# Patient Record
Sex: FEMALE | Race: WHITE | NOT HISPANIC OR LATINO | Employment: UNEMPLOYED | URBAN - METROPOLITAN AREA
[De-identification: names, ages, dates, MRNs, and addresses within clinical notes are randomized per-mention and may not be internally consistent; named-entity substitution may affect disease eponyms.]

---

## 2023-08-21 PROBLEM — R29.898 WEAKNESS OF LOWER EXTREMITY: Status: ACTIVE | Noted: 2023-08-21

## 2023-08-21 PROBLEM — I10 BENIGN ESSENTIAL HYPERTENSION: Status: ACTIVE | Noted: 2023-08-21

## 2023-08-21 PROBLEM — M19.039 OSTEOARTHRITIS OF WRIST: Status: ACTIVE | Noted: 2023-08-21

## 2023-08-21 PROBLEM — S82.839A FRACTURE, FIBULA, PROXIMAL: Status: ACTIVE | Noted: 2023-08-21

## 2023-08-21 PROBLEM — R26.89 ANTALGIC GAIT: Status: ACTIVE | Noted: 2023-08-21

## 2023-08-21 PROBLEM — M46.1 OSTEOARTHRITIS OF SACROILIAC JOINT (CMS-HCC): Status: ACTIVE | Noted: 2023-08-21

## 2023-08-21 PROBLEM — M19.049 CMC ARTHRITIS: Status: ACTIVE | Noted: 2023-08-21

## 2023-08-21 PROBLEM — M87.051 AVASCULAR NECROSIS OF BONE OF RIGHT HIP (MULTI): Status: ACTIVE | Noted: 2023-08-21

## 2023-08-21 PROBLEM — M25.659 STIFFNESS OF HIP JOINT: Status: ACTIVE | Noted: 2023-08-21

## 2023-08-21 PROBLEM — M79.605 LEFT LEG PAIN: Status: ACTIVE | Noted: 2023-08-21

## 2023-08-21 PROBLEM — G56.00 CARPAL TUNNEL SYNDROME: Status: ACTIVE | Noted: 2023-08-21

## 2023-08-21 PROBLEM — M22.2X2 PATELLOFEMORAL PAIN SYNDROME OF LEFT KNEE: Status: ACTIVE | Noted: 2023-08-21

## 2023-08-21 PROBLEM — M16.11 ARTHRITIS OF RIGHT HIP: Status: ACTIVE | Noted: 2023-08-21

## 2023-08-21 PROBLEM — E16.2 MULTIPLE EPISODES OF HYPOGLYCEMIA: Status: ACTIVE | Noted: 2023-08-21

## 2023-08-21 PROBLEM — F33.1 MODERATE RECURRENT MAJOR DEPRESSION (MULTI): Status: ACTIVE | Noted: 2023-08-21

## 2023-08-21 PROBLEM — S89.92XA LEFT KNEE INJURY: Status: ACTIVE | Noted: 2023-08-21

## 2023-08-21 PROBLEM — M17.11 ARTHRITIS OF RIGHT KNEE: Status: ACTIVE | Noted: 2023-08-21

## 2023-08-21 PROBLEM — M79.642 PAIN OF LEFT HAND: Status: ACTIVE | Noted: 2023-08-21

## 2023-08-21 PROBLEM — F51.02 INSOMNIA DUE TO PSYCHOLOGICAL STRESS: Status: ACTIVE | Noted: 2023-08-21

## 2023-08-21 PROBLEM — F41.9 ANXIETY DISORDER, UNSPECIFIED: Status: ACTIVE | Noted: 2023-08-21

## 2023-08-21 PROBLEM — R29.898 WEAKNESS OF RIGHT HIP: Status: ACTIVE | Noted: 2023-08-21

## 2023-08-21 PROBLEM — M47.816 DJD (DEGENERATIVE JOINT DISEASE), LUMBAR: Status: ACTIVE | Noted: 2023-08-21

## 2023-08-21 PROBLEM — M47.812 CERVICAL SPONDYLOSIS WITHOUT MYELOPATHY: Status: ACTIVE | Noted: 2023-08-21

## 2023-08-21 PROBLEM — M25.572 LEFT ANKLE PAIN: Status: ACTIVE | Noted: 2023-08-21

## 2023-08-21 PROBLEM — M25.539 WRIST PAIN: Status: ACTIVE | Noted: 2023-08-21

## 2023-08-21 PROBLEM — E03.9 HYPOTHYROIDISM: Status: ACTIVE | Noted: 2023-08-21

## 2023-08-21 RX ORDER — CELECOXIB 100 MG/1
100 CAPSULE ORAL DAILY
COMMUNITY
Start: 2020-05-12 | End: 2023-10-05 | Stop reason: ALTCHOICE

## 2023-08-21 RX ORDER — HYDROCODONE BITARTRATE AND ACETAMINOPHEN 5; 325 MG/1; MG/1
1 TABLET ORAL EVERY 6 HOURS PRN
COMMUNITY
Start: 2022-10-26 | End: 2023-10-05 | Stop reason: ALTCHOICE

## 2023-08-21 RX ORDER — HYDROCHLOROTHIAZIDE 12.5 MG/1
12.5 TABLET ORAL DAILY
COMMUNITY
Start: 2021-01-20 | End: 2023-10-05 | Stop reason: ALTCHOICE

## 2023-08-21 RX ORDER — TRAZODONE HYDROCHLORIDE 50 MG/1
50 TABLET ORAL NIGHTLY
COMMUNITY
Start: 2020-12-11 | End: 2023-10-05 | Stop reason: ALTCHOICE

## 2023-08-21 RX ORDER — LEVOTHYROXINE SODIUM 25 UG/1
25 CAPSULE ORAL DAILY
COMMUNITY

## 2023-08-21 RX ORDER — LOSARTAN POTASSIUM 50 MG/1
50 TABLET ORAL DAILY
COMMUNITY
End: 2024-03-15

## 2023-08-21 RX ORDER — FLUTICASONE PROPIONATE 50 MCG
1 SPRAY, SUSPENSION (ML) NASAL DAILY
COMMUNITY
Start: 2017-06-09 | End: 2023-10-05 | Stop reason: ALTCHOICE

## 2023-08-21 RX ORDER — CYCLOBENZAPRINE HCL 10 MG
10 TABLET ORAL 3 TIMES DAILY PRN
COMMUNITY
End: 2023-11-09

## 2023-08-21 RX ORDER — IBUPROFEN 800 MG/1
800 TABLET ORAL
COMMUNITY

## 2023-08-21 RX ORDER — NAPROXEN 500 MG/1
500 TABLET ORAL
COMMUNITY
Start: 2018-02-09 | End: 2023-10-05 | Stop reason: ALTCHOICE

## 2023-08-21 RX ORDER — GABAPENTIN 100 MG/1
100 CAPSULE ORAL DAILY
COMMUNITY
Start: 2023-07-17 | End: 2024-06-12

## 2023-08-21 RX ORDER — CITALOPRAM 10 MG/1
10 TABLET ORAL DAILY
COMMUNITY
Start: 2017-03-27 | End: 2023-10-05 | Stop reason: ALTCHOICE

## 2023-08-21 RX ORDER — POTASSIUM CHLORIDE 20 MEQ/1
20 TABLET, EXTENDED RELEASE ORAL DAILY
COMMUNITY
End: 2023-10-21

## 2023-10-05 ENCOUNTER — OFFICE VISIT (OUTPATIENT)
Dept: PRIMARY CARE | Facility: CLINIC | Age: 58
End: 2023-10-05
Payer: COMMERCIAL

## 2023-10-05 VITALS
SYSTOLIC BLOOD PRESSURE: 120 MMHG | OXYGEN SATURATION: 98 % | DIASTOLIC BLOOD PRESSURE: 84 MMHG | HEART RATE: 87 BPM | BODY MASS INDEX: 25.49 KG/M2 | WEIGHT: 153.2 LBS | TEMPERATURE: 98.1 F

## 2023-10-05 DIAGNOSIS — F51.02 INSOMNIA DUE TO PSYCHOLOGICAL STRESS: ICD-10-CM

## 2023-10-05 DIAGNOSIS — S83.282D OTHER TEAR OF LATERAL MENISCUS OF LEFT KNEE, UNSPECIFIED WHETHER OLD OR CURRENT TEAR, SUBSEQUENT ENCOUNTER: ICD-10-CM

## 2023-10-05 DIAGNOSIS — F51.02 INSOMNIA DUE TO PSYCHOLOGICAL STRESS: Primary | ICD-10-CM

## 2023-10-05 DIAGNOSIS — Z00.00 ROUTINE GENERAL MEDICAL EXAMINATION AT A HEALTH CARE FACILITY: Primary | ICD-10-CM

## 2023-10-05 DIAGNOSIS — Z12.31 VISIT FOR SCREENING MAMMOGRAM: ICD-10-CM

## 2023-10-05 DIAGNOSIS — E03.9 HYPOTHYROIDISM, UNSPECIFIED TYPE: ICD-10-CM

## 2023-10-05 DIAGNOSIS — I10 BENIGN ESSENTIAL HYPERTENSION: ICD-10-CM

## 2023-10-05 PROBLEM — M46.1 OSTEOARTHRITIS OF SACROILIAC JOINT (CMS-HCC): Status: RESOLVED | Noted: 2023-08-21 | Resolved: 2023-10-05

## 2023-10-05 PROBLEM — M25.539 WRIST PAIN: Status: RESOLVED | Noted: 2023-08-21 | Resolved: 2023-10-05

## 2023-10-05 PROBLEM — R29.898 WEAKNESS OF RIGHT HIP: Status: RESOLVED | Noted: 2023-08-21 | Resolved: 2023-10-05

## 2023-10-05 PROBLEM — S82.839A FRACTURE, FIBULA, PROXIMAL: Status: RESOLVED | Noted: 2023-08-21 | Resolved: 2023-10-05

## 2023-10-05 PROBLEM — R29.898 WEAKNESS OF LOWER EXTREMITY: Status: RESOLVED | Noted: 2023-08-21 | Resolved: 2023-10-05

## 2023-10-05 PROBLEM — R26.89 ANTALGIC GAIT: Status: RESOLVED | Noted: 2023-08-21 | Resolved: 2023-10-05

## 2023-10-05 PROBLEM — S89.92XA LEFT KNEE INJURY: Status: RESOLVED | Noted: 2023-08-21 | Resolved: 2023-10-05

## 2023-10-05 PROBLEM — E16.2 MULTIPLE EPISODES OF HYPOGLYCEMIA: Status: RESOLVED | Noted: 2023-08-21 | Resolved: 2023-10-05

## 2023-10-05 PROBLEM — M22.2X2 PATELLOFEMORAL PAIN SYNDROME OF LEFT KNEE: Status: RESOLVED | Noted: 2023-08-21 | Resolved: 2023-10-05

## 2023-10-05 PROBLEM — M79.642 PAIN OF LEFT HAND: Status: RESOLVED | Noted: 2023-08-21 | Resolved: 2023-10-05

## 2023-10-05 PROBLEM — M79.605 LEFT LEG PAIN: Status: RESOLVED | Noted: 2023-08-21 | Resolved: 2023-10-05

## 2023-10-05 PROBLEM — M25.659 STIFFNESS OF HIP JOINT: Status: RESOLVED | Noted: 2023-08-21 | Resolved: 2023-10-05

## 2023-10-05 PROBLEM — M87.051 AVASCULAR NECROSIS OF BONE OF RIGHT HIP (MULTI): Status: RESOLVED | Noted: 2023-08-21 | Resolved: 2023-10-05

## 2023-10-05 PROBLEM — M25.572 LEFT ANKLE PAIN: Status: RESOLVED | Noted: 2023-08-21 | Resolved: 2023-10-05

## 2023-10-05 PROCEDURE — 3079F DIAST BP 80-89 MM HG: CPT | Performed by: FAMILY MEDICINE

## 2023-10-05 PROCEDURE — 99396 PREV VISIT EST AGE 40-64: CPT | Performed by: FAMILY MEDICINE

## 2023-10-05 PROCEDURE — 3074F SYST BP LT 130 MM HG: CPT | Performed by: FAMILY MEDICINE

## 2023-10-05 PROCEDURE — 1036F TOBACCO NON-USER: CPT | Performed by: FAMILY MEDICINE

## 2023-10-05 RX ORDER — LORAZEPAM 0.5 MG/1
TABLET ORAL
COMMUNITY
Start: 2023-09-06 | End: 2023-10-28

## 2023-10-05 RX ORDER — ZOLPIDEM TARTRATE 5 MG/1
TABLET ORAL
COMMUNITY
Start: 2023-08-28 | End: 2023-10-06

## 2023-10-05 RX ORDER — ONDANSETRON 4 MG/1
4 TABLET, ORALLY DISINTEGRATING ORAL
COMMUNITY
Start: 2023-02-17 | End: 2024-04-11 | Stop reason: ALTCHOICE

## 2023-10-05 ASSESSMENT — PATIENT HEALTH QUESTIONNAIRE - PHQ9
SUM OF ALL RESPONSES TO PHQ9 QUESTIONS 1 & 2: 0
1. LITTLE INTEREST OR PLEASURE IN DOING THINGS: NOT AT ALL
2. FEELING DOWN, DEPRESSED OR HOPELESS: NOT AT ALL

## 2023-10-05 ASSESSMENT — ENCOUNTER SYMPTOMS
OCCASIONAL FEELINGS OF UNSTEADINESS: 0
DEPRESSION: 0
LOSS OF SENSATION IN FEET: 0

## 2023-10-05 ASSESSMENT — LIFESTYLE VARIABLES
AUDIT-C TOTAL SCORE: 0
SKIP TO QUESTIONS 9-10: 1
HOW MANY STANDARD DRINKS CONTAINING ALCOHOL DO YOU HAVE ON A TYPICAL DAY: PATIENT DOES NOT DRINK
HOW OFTEN DO YOU HAVE A DRINK CONTAINING ALCOHOL: NEVER
HOW OFTEN DO YOU HAVE SIX OR MORE DRINKS ON ONE OCCASION: NEVER

## 2023-10-05 ASSESSMENT — PAIN SCALES - GENERAL: PAINLEVEL: 0-NO PAIN

## 2023-10-05 NOTE — PROGRESS NOTES
Subjective   Patient ID: Aure Cabral is a 58 y.o. female who presents for Annual Exam.    Colonoscopy: due - wants to check insurance  Mammogram: 2021  Pap Smear:  Bone Density: NA  Immunizations:    Here for physical.  Overall doing well.  Pt has been trying to get off of ambien    Pt is having left knee pain.  Patient is getting some instability.  Knee will crack and then buckle.  Patient has history of ACL repair in same knee in past.  Pain with going sitting to standing.  Wearing copper knee brace.  Pt has lateral meniscus tear and chondral loss of left.      Hypertension  -Patient is here for follow-up of elevated blood pressure.   -Blood pressure is well controlled at home.   -Cardiac symptoms: none.   -Patient denies chest pain, dyspnea, and irregular heart beat.   -Cardiologist:           Review of Systems    Objective   /84 (BP Location: Right arm, Patient Position: Sitting)   Pulse 87   Temp 36.7 °C (98.1 °F) (Temporal)   Wt 69.5 kg (153 lb 3.2 oz)   SpO2 98%   BMI 25.49 kg/m²     Physical Exam  Vitals reviewed.   Constitutional:       General: She is not in acute distress.  Cardiovascular:      Rate and Rhythm: Normal rate and regular rhythm.   Pulmonary:      Effort: Pulmonary effort is normal.      Breath sounds: No wheezing or rhonchi.   Musculoskeletal:      Right lower leg: No edema.      Left lower leg: No edema.   Lymphadenopathy:      Cervical: No cervical adenopathy.   Neurological:      Mental Status: She is alert.         Assessment/Plan   Diagnoses and all orders for this visit:  Routine general medical examination at a health care facility  Insomnia due to psychological stress  Benign essential hypertension  -     Lipid Panel; Future  -     TSH with reflex to Free T4 if abnormal; Future  Hypothyroidism, unspecified type  -     Lipid Panel; Future  -     TSH with reflex to Free T4 if abnormal; Future  Visit for screening mammogram  -     BI mammo bilateral screening  tomosynthesis; Future  Other tear of lateral meniscus of left knee, unspecified whether old or current tear, subsequent encounter  Other orders  -     Follow Up In Primary Care - Established; Future  -     Follow Up In Primary Care - Nurse Visit; Future

## 2023-10-05 NOTE — PATIENT INSTRUCTIONS
-Here for physical.      -For blood pressure - cut losartan 25 mg once a day.  Recommend home electronic blood pressure monitor - goal is < 140/90.  We will set up 3-4 week nurse visit.     -For sleep - continue to wean off of ambien - take 1/2 tab every other day for 1-2 weeks, then stop.  Use melatonin, sleepy time tea, sleep meditation.     -For thyroid - restarted levothyroxine.  Recheck TSH    -For cholesterol - recommend whole foods, limit added sugar foods.  We will restart thyroid and recheck and see if improved.     -For left knee - likely left lateral meniscus tear and chondral injury - recommend referral to orthopedics - call when ready.     Follow up in 6 months.

## 2023-10-06 RX ORDER — ZOLPIDEM TARTRATE 5 MG/1
2.5 TABLET ORAL NIGHTLY PRN
Qty: 30 TABLET | Refills: 0 | Status: SHIPPED | OUTPATIENT
Start: 2023-10-06 | End: 2023-11-27

## 2023-10-21 DIAGNOSIS — E87.6 HYPOKALEMIA: Primary | ICD-10-CM

## 2023-10-21 RX ORDER — POTASSIUM CHLORIDE 1500 MG/1
TABLET, EXTENDED RELEASE ORAL DAILY
Qty: 90 TABLET | Refills: 0 | Status: SHIPPED | OUTPATIENT
Start: 2023-10-21 | End: 2024-01-23

## 2023-10-28 DIAGNOSIS — F41.0 PANIC DISORDER WITHOUT AGORAPHOBIA: Primary | ICD-10-CM

## 2023-10-28 RX ORDER — LORAZEPAM 0.5 MG/1
TABLET ORAL
Qty: 10 TABLET | Refills: 0 | Status: SHIPPED | OUTPATIENT
Start: 2023-10-28 | End: 2023-11-19

## 2023-11-06 ENCOUNTER — APPOINTMENT (OUTPATIENT)
Dept: PRIMARY CARE | Facility: CLINIC | Age: 58
End: 2023-11-06
Payer: COMMERCIAL

## 2023-11-08 DIAGNOSIS — M47.812 CERVICAL SPONDYLOSIS WITHOUT MYELOPATHY: Primary | ICD-10-CM

## 2023-11-09 RX ORDER — CYCLOBENZAPRINE HCL 10 MG
10 TABLET ORAL 3 TIMES DAILY PRN
Qty: 270 TABLET | Refills: 0 | Status: SHIPPED | OUTPATIENT
Start: 2023-11-09 | End: 2024-03-15

## 2023-11-17 DIAGNOSIS — F41.0 PANIC DISORDER WITHOUT AGORAPHOBIA: ICD-10-CM

## 2023-11-19 RX ORDER — LORAZEPAM 0.5 MG/1
TABLET ORAL
Qty: 10 TABLET | Refills: 1 | Status: SHIPPED | OUTPATIENT
Start: 2023-11-19 | End: 2024-01-22

## 2023-11-26 DIAGNOSIS — F51.02 INSOMNIA DUE TO PSYCHOLOGICAL STRESS: ICD-10-CM

## 2023-11-27 RX ORDER — ZOLPIDEM TARTRATE 5 MG/1
TABLET ORAL
Qty: 30 TABLET | Refills: 2 | Status: SHIPPED | OUTPATIENT
Start: 2023-11-27 | End: 2024-05-13

## 2024-01-22 DIAGNOSIS — F41.0 PANIC DISORDER WITHOUT AGORAPHOBIA: ICD-10-CM

## 2024-01-22 RX ORDER — LORAZEPAM 0.5 MG/1
TABLET ORAL
Qty: 10 TABLET | Refills: 2 | Status: SHIPPED | OUTPATIENT
Start: 2024-01-22 | End: 2024-05-16

## 2024-01-23 DIAGNOSIS — E87.6 HYPOKALEMIA: ICD-10-CM

## 2024-01-23 RX ORDER — POTASSIUM CHLORIDE 20 MEQ/1
20 TABLET, EXTENDED RELEASE ORAL DAILY
Qty: 90 TABLET | Refills: 0 | Status: SHIPPED | OUTPATIENT
Start: 2024-01-23 | End: 2024-04-22

## 2024-02-16 ENCOUNTER — APPOINTMENT (OUTPATIENT)
Dept: PRIMARY CARE | Facility: CLINIC | Age: 59
End: 2024-02-16
Payer: COMMERCIAL

## 2024-02-19 ENCOUNTER — TELEPHONE (OUTPATIENT)
Dept: ORTHOPEDIC SURGERY | Facility: CLINIC | Age: 59
End: 2024-02-19
Payer: COMMERCIAL

## 2024-02-19 DIAGNOSIS — M79.642 HAND PAIN, LEFT: ICD-10-CM

## 2024-02-19 NOTE — TELEPHONE ENCOUNTER
3/4/24 lt hand pain  Patient wants an xray of her hand, but has to have it done at a [facility] because xray at [facility] is out of network. Are you able to tell me once order is in so I can call her? Thank you

## 2024-03-04 ENCOUNTER — OFFICE VISIT (OUTPATIENT)
Dept: ORTHOPEDIC SURGERY | Facility: CLINIC | Age: 59
End: 2024-03-04
Payer: COMMERCIAL

## 2024-03-04 DIAGNOSIS — M67.432 GANGLION OF LEFT WRIST: Primary | ICD-10-CM

## 2024-03-04 PROCEDURE — 99213 OFFICE O/P EST LOW 20 MIN: CPT | Performed by: ORTHOPAEDIC SURGERY

## 2024-03-04 PROCEDURE — 1036F TOBACCO NON-USER: CPT | Performed by: ORTHOPAEDIC SURGERY

## 2024-03-04 ASSESSMENT — PAIN - FUNCTIONAL ASSESSMENT: PAIN_FUNCTIONAL_ASSESSMENT: NO/DENIES PAIN

## 2024-03-05 NOTE — PROGRESS NOTES
History of Present Illness:  Chief Complaint   Patient presents with    Left Hand - Pain     58-year-old female status post left thumb CMC arthroplasty in October 2022.  She noticed a mass about her left dorsal wrist a few weeks ago and has had increasing pain around that area.  The pain was initially located about the dorsal wrist, but now seems to be radiating towards her thumb as well.  No regular numbness or tingling, but she did have slight paresthesias into her small and ring finger this morning that resolved with arm repositioning.  This has not occurred previously.    Past Medical History:   Diagnosis Date    Avascular necrosis of bone of right hip (CMS/[facility]) 08/21/2023       Medication Documentation Review Audit       Reviewed by Lui Connor MD (Physician) on 03/05/24 at 0711      Medication Order Taking? Sig Documenting Provider Last Dose Status   cyclobenzaprine (Flexeril) 10 mg tablet [id number]  Take 1 tablet by mouth three times daily as needed Abelino Otto, DO  Active   gabapentin (Neurontin) 100 mg capsule [id number] No Take 1 capsule (100 mg) by mouth once daily. As needed Historical Provider, MD Taking Active   ibuprofen 800 mg tablet [id number] No Take 1 tablet (800 mg) by mouth 3 times a day with meals. Historical Provider, MD Taking Active   levothyroxine (Tirosint) 25 mcg capsule [id number] No 1 capsule (25 mcg) once daily. Historical Provider, MD Taking Active   LORazepam (Ativan) 0.5 mg tablet [id number]  TAKE 1/2 TO 1 (ONE-HALF TO ONE) TABLET BY MOUTH ONCE DAILY AS NEEDED FOR 10 DAYS Abelino Otto, DO  Active   losartan (Cozaar) 50 mg tablet [id number] No Take 1 tablet (50 mg) by mouth once daily. Historical Provider, MD Taking Active   multivit-min/iron/folic acid/K (ADULTS MULTIVITAMIN ORAL) [id number] No Take 1 tablet by mouth once daily. As directed Historical Provider, MD Taking Active   ondansetron ODT (Zofran-ODT) 4 mg disintegrating tablet [id number] No Take 1 tablet (4 mg) by  mouth. Historical Provider, MD Taking Active   potassium chloride CR 20 mEq ER tablet [id number]  Take 1 tablet by mouth once daily Abelino Otto, DO  Active   zolpidem (Ambien) 5 mg tablet [id number]  TAKE 1/2 (ONE-HALF) TABLET BY MOUTH AS NEEDED AT BEDTIME FOR SLEEP Abelino Otto, DO  Active                    Allergies   Allergen Reactions    Celecoxib Itching    Diazepam Headache    Fluoxetine Other    Hydroxyzine Pamoate Unknown    Lisinopril Cough    Melatonin Unknown    Mirtazapine Other    Nabumetone Itching    Opioids - Morphine Analogues Unknown    Oxycodone-Acetaminophen Itching    Paroxetine Hcl Other    Tramadol Dizziness    Trazodone Hcl Other    Valerian Unknown    Zolpidem Tartrate Headache    Levothyroxine Palpitations       Social History     Socioeconomic History    Marital status:      Spouse name: Not on file    Number of children: Not on file    Years of education: Not on file    Highest education level: Not on file   Occupational History    Not on file   Tobacco Use    Smoking status: Never    Smokeless tobacco: Never   Substance and Sexual Activity    Alcohol use: Never    Drug use: Never    Sexual activity: Not on file   Other Topics Concern    Not on file   Social History Narrative    Not on file     Social Determinants of Health     Financial Resource Strain: Not on file   Food Insecurity: Not on file   Transportation Needs: Not on file   Physical Activity: Not on file   Stress: Not on file   Social Connections: Not on file   Intimate Partner Violence: Not on file   Housing Stability: Not on file       Past Surgical History:   Procedure Laterality Date    ANTERIOR CRUCIATE LIGAMENT REPAIR  02/24/2015    Primary Repair Of Knee Ligament Cruciate Anterior    MR ARTHROGRAM SHOULDER RIGHT W FL GUIDED INJECTION Right 10/12/2012    MR SHOULDER ARTHROGRAM RIGHT W FL GUIDED INJECTION [facility]        Review of Systems   GENERAL: Negative for malaise, significant weight loss,  fever  MUSCULOSKELETAL: see HPI  NEURO:  Negative     Physical Examination  Constitutional: Appears well-developed and well-nourished.  Head: Normocephalic and atraumatic.  Eyes: EOMI grossly  Cardiovascular: Intact distal pulses.   Respiratory: Effort normal. No respiratory distress.  Neurologic: Alert and oriented to person, place, and time.  Skin: Skin is warm and dry.  Hematologic / Lymphatic: No lymphedema, lymphangitis.  Psychiatric: normal mood and affect. Behavior is normal.   Musculoskeletal:  Left hand/wrist: Palpable mass about the dorsal wrist with transillumination.  This measures approximately 1 x 1 cm.  Slightly firm and mobile.  Tenderness about this area as well as about the radio-scaphoid joint.  No tenderness about thumb CMC.  Sensation intact throughout distally.  Negative Tinel's at level of carpal tunnel.  Negative Durkan's/Phalen's.  Negative elbow flexion test.    Radiographs: Left hand radiographs ordered and available for my review from February 29, 2024: Status post trapeziectomy with slight metacarpal subsidence.  Narrowing of radio-scaphoid joint     Assessment:  Patient with left wrist ganglion likely secondary to underlying radioscaphoid arthritic changes.  Primary pain generator seems to be coming from arthritis.       Plan:  We discussed diagnoses as well as risks and benefits of various treatment options including anti-inflammatories (oral versus topical) as well as potential role of bracing and corticosteroid injections.  She may consider injection after discussions with her insurance and will call for scheduling if she wishes to proceed.

## 2024-03-14 DIAGNOSIS — M47.812 CERVICAL SPONDYLOSIS WITHOUT MYELOPATHY: ICD-10-CM

## 2024-03-15 ENCOUNTER — APPOINTMENT (OUTPATIENT)
Dept: PRIMARY CARE | Facility: CLINIC | Age: 59
End: 2024-03-15
Payer: COMMERCIAL

## 2024-03-15 DIAGNOSIS — I10 BENIGN ESSENTIAL HYPERTENSION: ICD-10-CM

## 2024-03-15 RX ORDER — CYCLOBENZAPRINE HCL 10 MG
10 TABLET ORAL 3 TIMES DAILY PRN
Qty: 270 TABLET | Refills: 0 | Status: SHIPPED | OUTPATIENT
Start: 2024-03-15

## 2024-03-15 RX ORDER — LOSARTAN POTASSIUM 50 MG/1
50 TABLET ORAL DAILY
Qty: 90 TABLET | Refills: 1 | Status: SHIPPED | OUTPATIENT
Start: 2024-03-15 | End: 2024-04-11 | Stop reason: ALTCHOICE

## 2024-04-08 PROBLEM — E55.9 VITAMIN D DEFICIENCY: Status: ACTIVE | Noted: 2024-04-08

## 2024-04-11 ENCOUNTER — OFFICE VISIT (OUTPATIENT)
Dept: PRIMARY CARE | Facility: CLINIC | Age: 59
End: 2024-04-11
Payer: COMMERCIAL

## 2024-04-11 VITALS
OXYGEN SATURATION: 97 % | HEART RATE: 108 BPM | BODY MASS INDEX: 25.33 KG/M2 | SYSTOLIC BLOOD PRESSURE: 104 MMHG | WEIGHT: 152.2 LBS | DIASTOLIC BLOOD PRESSURE: 70 MMHG

## 2024-04-11 DIAGNOSIS — T78.40XA ALLERGY, INITIAL ENCOUNTER: ICD-10-CM

## 2024-04-11 DIAGNOSIS — Z12.31 VISIT FOR SCREENING MAMMOGRAM: ICD-10-CM

## 2024-04-11 DIAGNOSIS — F51.02 INSOMNIA DUE TO PSYCHOLOGICAL STRESS: ICD-10-CM

## 2024-04-11 DIAGNOSIS — I10 BENIGN ESSENTIAL HYPERTENSION: Primary | ICD-10-CM

## 2024-04-11 PROCEDURE — 1036F TOBACCO NON-USER: CPT | Performed by: FAMILY MEDICINE

## 2024-04-11 PROCEDURE — 99214 OFFICE O/P EST MOD 30 MIN: CPT | Performed by: FAMILY MEDICINE

## 2024-04-11 PROCEDURE — 3078F DIAST BP <80 MM HG: CPT | Performed by: FAMILY MEDICINE

## 2024-04-11 PROCEDURE — 3074F SYST BP LT 130 MM HG: CPT | Performed by: FAMILY MEDICINE

## 2024-04-11 RX ORDER — FLUTICASONE PROPIONATE 50 MCG
1 SPRAY, SUSPENSION (ML) NASAL DAILY
Qty: 16 G | Refills: 5 | Status: SHIPPED | OUTPATIENT
Start: 2024-04-11 | End: 2025-04-11

## 2024-04-11 ASSESSMENT — LIFESTYLE VARIABLES
HOW OFTEN DO YOU HAVE SIX OR MORE DRINKS ON ONE OCCASION: NEVER
SKIP TO QUESTIONS 9-10: 1
HOW MANY STANDARD DRINKS CONTAINING ALCOHOL DO YOU HAVE ON A TYPICAL DAY: PATIENT DOES NOT DRINK
AUDIT-C TOTAL SCORE: 0
HOW OFTEN DO YOU HAVE A DRINK CONTAINING ALCOHOL: NEVER

## 2024-04-11 ASSESSMENT — ENCOUNTER SYMPTOMS
DEPRESSION: 0
LOSS OF SENSATION IN FEET: 0
OCCASIONAL FEELINGS OF UNSTEADINESS: 0

## 2024-04-11 ASSESSMENT — PATIENT HEALTH QUESTIONNAIRE - PHQ9
1. LITTLE INTEREST OR PLEASURE IN DOING THINGS: NOT AT ALL
2. FEELING DOWN, DEPRESSED OR HOPELESS: NOT AT ALL
SUM OF ALL RESPONSES TO PHQ9 QUESTIONS 1 & 2: 0

## 2024-04-11 ASSESSMENT — PAIN SCALES - GENERAL: PAINLEVEL: 0-NO PAIN

## 2024-04-11 NOTE — PROGRESS NOTES
Subjective   Patient ID: Aure Cabral is a 58 y.o. female who presents for 6 MO F/U.    Here for routine follow up.  Pt had a recent fall down stairs.  Hit arm and stairs.  Injured her knee and thigh.      Pt is having left knee pain.  Patient is getting some instability.  Knee will crack and then buckle.  Patient has history of ACL repair in same knee in past.  Pain with going sitting to standing.  Wearing copper knee brace.  Pt has lateral meniscus tear and chondral loss of left.  Using TENS unit.      Allergies  -Pt is renting a house.  Pt has been congested for 1 year.  Not sure if allergic to something house.  Pt is having sinus headaches.  Patient has tried sudafed and gauifenesin.  Pt will be moving in may.  Patient does not have running nose feels pressure.      Hypertension  -Patient is here for follow-up of elevated blood pressure.   -Blood pressure is well controlled at home.  Off of losartan.    -Cardiac symptoms: none.   -Patient denies chest pain, dyspnea, and irregular heart beat.   -Cardiologist:    Sleep:  -Using ambien - weaning off of.  Taking 1/2 of 5mg.  Using magnesium.  Not sleeping well.             Review of Systems    Objective   /70 (BP Location: Right arm, Patient Position: Sitting)   Pulse 108   Wt 69 kg (152 lb 3.2 oz)   SpO2 97%   BMI 25.33 kg/m²     Physical Exam  Vitals reviewed.   Constitutional:       General: She is not in acute distress.  Cardiovascular:      Rate and Rhythm: Normal rate and regular rhythm.   Pulmonary:      Effort: Pulmonary effort is normal.      Breath sounds: No wheezing or rhonchi.   Musculoskeletal:      Right lower leg: No edema.      Left lower leg: No edema.   Lymphadenopathy:      Cervical: No cervical adenopathy.   Neurological:      Mental Status: She is alert.     Colon Cancer Screening Refusal.    -Patient refuses colon cancer screening including FIT testing, Cologuard and colonoscopy. Is aware of the implications of refusal including  missed early detection of colorectal cancer which could lead to increased morbidity and mortality.     Assessment/Plan   Diagnoses and all orders for this visit:  Benign essential hypertension  -     TSH with reflex to Free T4 if abnormal; Future  -     Lipid Panel; Future  Allergy, initial encounter  -     fluticasone (Flonase) 50 mcg/actuation nasal spray; Administer 1 spray into each nostril once daily. Shake gently. Before first use, prime pump. After use, clean tip and replace cap.  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  Please check blood pressure 2-3 times a week. Goal is <140/90. If you are above this frequently, please call for medication adjustment. Recommend upper arm electronic cuff.    For allergies - recommend flonase 2 sprays each nostril.  If not helping, add claritin.      Sleep - weaning down ambien.      For thryoid - abnormal last check - we will recheck    For cholesterol - elevated - we will recheck.      Follow up in 6 months for physical

## 2024-04-11 NOTE — PATIENT INSTRUCTIONS
For blood pressure - well controlled off of medication.  Please check blood pressure 2-3 times a week. Goal is <140/90. If you are above this frequently, please call for medication adjustment. Recommend upper arm electronic cuff.    For allergies - recommend flonase 2 sprays each nostril.  If not helping, add claritin.      Sleep - weaning down ambien.      For thyroid - abnormal last check - we will recheck    For cholesterol - elevated - we will recheck.

## 2024-04-22 DIAGNOSIS — E87.6 HYPOKALEMIA: ICD-10-CM

## 2024-04-22 RX ORDER — POTASSIUM CHLORIDE 1500 MG/1
20 TABLET, EXTENDED RELEASE ORAL DAILY
Qty: 90 TABLET | Refills: 0 | Status: SHIPPED | OUTPATIENT
Start: 2024-04-22

## 2024-05-11 DIAGNOSIS — F51.02 INSOMNIA DUE TO PSYCHOLOGICAL STRESS: ICD-10-CM

## 2024-05-13 RX ORDER — ZOLPIDEM TARTRATE 5 MG/1
TABLET ORAL
Qty: 15 TABLET | Refills: 2 | Status: SHIPPED | OUTPATIENT
Start: 2024-05-13

## 2024-05-15 DIAGNOSIS — F41.0 PANIC DISORDER WITHOUT AGORAPHOBIA: ICD-10-CM

## 2024-05-16 RX ORDER — LORAZEPAM 0.5 MG/1
TABLET ORAL
Qty: 10 TABLET | Refills: 2 | Status: SHIPPED | OUTPATIENT
Start: 2024-05-16

## 2024-07-02 ENCOUNTER — HOSPITAL ENCOUNTER (EMERGENCY)
Facility: HOSPITAL | Age: 59
Discharge: HOME | End: 2024-07-02
Attending: STUDENT IN AN ORGANIZED HEALTH CARE EDUCATION/TRAINING PROGRAM
Payer: COMMERCIAL

## 2024-07-02 VITALS
WEIGHT: 145 LBS | TEMPERATURE: 97.7 F | DIASTOLIC BLOOD PRESSURE: 93 MMHG | SYSTOLIC BLOOD PRESSURE: 138 MMHG | BODY MASS INDEX: 24.16 KG/M2 | HEIGHT: 65 IN | HEART RATE: 94 BPM | RESPIRATION RATE: 16 BRPM | OXYGEN SATURATION: 98 %

## 2024-07-02 DIAGNOSIS — A60.04 HERPES SIMPLEX VULVOVAGINITIS: ICD-10-CM

## 2024-07-02 DIAGNOSIS — R10.2 VAGINAL PAIN: Primary | ICD-10-CM

## 2024-07-02 LAB
APPEARANCE UR: ABNORMAL
BILIRUB UR STRIP.AUTO-MCNC: NEGATIVE MG/DL
CLUE CELLS SPEC QL WET PREP: NORMAL
CLUE CELLS SPEC QL WET PREP: NORMAL
COLOR UR: ABNORMAL
GLUCOSE UR STRIP.AUTO-MCNC: NORMAL MG/DL
KETONES UR STRIP.AUTO-MCNC: ABNORMAL MG/DL
LEUKOCYTE ESTERASE UR QL STRIP.AUTO: ABNORMAL
MUCOUS THREADS #/AREA URNS AUTO: ABNORMAL /LPF
NITRITE UR QL STRIP.AUTO: NEGATIVE
PH UR STRIP.AUTO: 5.5 [PH]
PROT UR STRIP.AUTO-MCNC: NEGATIVE MG/DL
RBC # UR STRIP.AUTO: NEGATIVE /UL
RBC #/AREA URNS AUTO: ABNORMAL /HPF
SP GR UR STRIP.AUTO: 1.01
SQUAMOUS #/AREA URNS AUTO: ABNORMAL /HPF
T VAGINALIS SPEC QL WET PREP: NORMAL
T VAGINALIS SPEC QL WET PREP: NORMAL
TRICHOMONAS REFLEX COMMENT: NORMAL
UROBILINOGEN UR STRIP.AUTO-MCNC: NORMAL MG/DL
WBC #/AREA URNS AUTO: ABNORMAL /HPF
WBC CLUMPS #/AREA URNS AUTO: ABNORMAL /HPF
WBC VAG QL WET PREP: NORMAL
WBC VAG QL WET PREP: NORMAL
YEAST VAG QL WET PREP: NORMAL
YEAST VAG QL WET PREP: NORMAL

## 2024-07-02 PROCEDURE — 2500000004 HC RX 250 GENERAL PHARMACY W/ HCPCS (ALT 636 FOR OP/ED): Performed by: STUDENT IN AN ORGANIZED HEALTH CARE EDUCATION/TRAINING PROGRAM

## 2024-07-02 PROCEDURE — 81001 URINALYSIS AUTO W/SCOPE: CPT | Performed by: STUDENT IN AN ORGANIZED HEALTH CARE EDUCATION/TRAINING PROGRAM

## 2024-07-02 PROCEDURE — 96372 THER/PROPH/DIAG INJ SC/IM: CPT | Performed by: STUDENT IN AN ORGANIZED HEALTH CARE EDUCATION/TRAINING PROGRAM

## 2024-07-02 PROCEDURE — 87210 SMEAR WET MOUNT SALINE/INK: CPT | Mod: 59 | Performed by: STUDENT IN AN ORGANIZED HEALTH CARE EDUCATION/TRAINING PROGRAM

## 2024-07-02 PROCEDURE — 87491 CHLMYD TRACH DNA AMP PROBE: CPT | Mod: GEALAB | Performed by: STUDENT IN AN ORGANIZED HEALTH CARE EDUCATION/TRAINING PROGRAM

## 2024-07-02 PROCEDURE — 87086 URINE CULTURE/COLONY COUNT: CPT | Mod: GEALAB | Performed by: STUDENT IN AN ORGANIZED HEALTH CARE EDUCATION/TRAINING PROGRAM

## 2024-07-02 PROCEDURE — 2500000002 HC RX 250 W HCPCS SELF ADMINISTERED DRUGS (ALT 637 FOR MEDICARE OP, ALT 636 FOR OP/ED): Performed by: STUDENT IN AN ORGANIZED HEALTH CARE EDUCATION/TRAINING PROGRAM

## 2024-07-02 PROCEDURE — 2500000005 HC RX 250 GENERAL PHARMACY W/O HCPCS: Performed by: STUDENT IN AN ORGANIZED HEALTH CARE EDUCATION/TRAINING PROGRAM

## 2024-07-02 PROCEDURE — 99283 EMERGENCY DEPT VISIT LOW MDM: CPT

## 2024-07-02 PROCEDURE — 87661 TRICHOMONAS VAGINALIS AMPLIF: CPT | Mod: GEALAB | Performed by: STUDENT IN AN ORGANIZED HEALTH CARE EDUCATION/TRAINING PROGRAM

## 2024-07-02 RX ORDER — LIDOCAINE HYDROCHLORIDE 20 MG/ML
1 JELLY TOPICAL ONCE
Qty: 10 ML | Refills: 0 | Status: SHIPPED | OUTPATIENT
Start: 2024-07-02 | End: 2024-07-02

## 2024-07-02 RX ORDER — VALACYCLOVIR HYDROCHLORIDE 1 G/1
1000 TABLET, FILM COATED ORAL 2 TIMES DAILY
Qty: 20 TABLET | Refills: 0 | Status: SHIPPED | OUTPATIENT
Start: 2024-07-02 | End: 2024-07-12

## 2024-07-02 RX ORDER — KETOROLAC TROMETHAMINE 30 MG/ML
30 INJECTION, SOLUTION INTRAMUSCULAR; INTRAVENOUS ONCE
Status: COMPLETED | OUTPATIENT
Start: 2024-07-02 | End: 2024-07-02

## 2024-07-02 RX ORDER — HYDROCODONE BITARTRATE AND ACETAMINOPHEN 5; 325 MG/1; MG/1
1 TABLET ORAL EVERY 6 HOURS PRN
Qty: 8 TABLET | Refills: 0 | Status: SHIPPED | OUTPATIENT
Start: 2024-07-02 | End: 2024-07-04

## 2024-07-02 RX ORDER — LIDOCAINE HYDROCHLORIDE 20 MG/ML
1 JELLY TOPICAL ONCE
Status: COMPLETED | OUTPATIENT
Start: 2024-07-02 | End: 2024-07-02

## 2024-07-02 RX ORDER — VALACYCLOVIR HYDROCHLORIDE 500 MG/1
1000 TABLET, FILM COATED ORAL ONCE
Status: COMPLETED | OUTPATIENT
Start: 2024-07-02 | End: 2024-07-02

## 2024-07-02 ASSESSMENT — LIFESTYLE VARIABLES
TOTAL SCORE: 0
EVER FELT BAD OR GUILTY ABOUT YOUR DRINKING: NO
HAVE PEOPLE ANNOYED YOU BY CRITICIZING YOUR DRINKING: NO
HAVE YOU EVER FELT YOU SHOULD CUT DOWN ON YOUR DRINKING: NO
EVER HAD A DRINK FIRST THING IN THE MORNING TO STEADY YOUR NERVES TO GET RID OF A HANGOVER: NO

## 2024-07-02 ASSESSMENT — PAIN SCALES - GENERAL
PAINLEVEL_OUTOF10: 0 - NO PAIN
PAINLEVEL_OUTOF10: 6
PAINLEVEL_OUTOF10: 4

## 2024-07-02 ASSESSMENT — COLUMBIA-SUICIDE SEVERITY RATING SCALE - C-SSRS
2. HAVE YOU ACTUALLY HAD ANY THOUGHTS OF KILLING YOURSELF?: NO
6. HAVE YOU EVER DONE ANYTHING, STARTED TO DO ANYTHING, OR PREPARED TO DO ANYTHING TO END YOUR LIFE?: NO
1. IN THE PAST MONTH, HAVE YOU WISHED YOU WERE DEAD OR WISHED YOU COULD GO TO SLEEP AND NOT WAKE UP?: NO

## 2024-07-02 ASSESSMENT — PAIN - FUNCTIONAL ASSESSMENT: PAIN_FUNCTIONAL_ASSESSMENT: 0-10

## 2024-07-02 ASSESSMENT — PAIN DESCRIPTION - LOCATION: LOCATION: VAGINA

## 2024-07-02 NOTE — ED TRIAGE NOTES
"Patient here for Saturday vagina became \"raw\" went to [facility] and was diagnosed with vaginal atrophy. Was started on Nystatin, estrogen cream and vagisil States it doesn't itch but it hurts  "

## 2024-07-02 NOTE — ED PROVIDER NOTES
"CC: Vaginitis/Bacterial Vaginosis (Saturday vagina became \"raw\" went to [facility] and was diagnosed with vaginal atrophy. Was started on Nystatin, estrogen cream and vagisil States it doesn't itch but it hurts)     HPI:  Patient is a 58-year-old female who presents to the emergency department with severe pain around her vagina.  Started on Saturday.  Thought she may have heard a pop and has not been able to pee due to the severe burning when it touches her vaginal ring.  States she has not been sexually active in 3 years.  She went to [facility] and was diagnosed with vaginal atrophy and discharged with nystatin vaginal cream and badges still that is not providing any relief of symptoms.  Patient is in tears.  She states she is drinking less water so that she does not urinate as much as it burns when the urine touches the area.  Patient denies fevers or chills.  She does report a lot of increased stress lately due to moving.    Records Reviewed:  Recent available ED and inpatient notes reviewed in EMR.    PMHx/PSHx:  Per HPI.   - has a past medical history of Avascular necrosis of bone of right hip (Multi).  - has a past surgical history that includes Anterior cruciate ligament repair (02/24/2015) and MR arthrogram shoulder right w FL guided injection (Right, 10/12/2012).  - has Anxiety disorder, unspecified; Arthritis of right hip; Arthritis of right knee; Benign essential hypertension; Cervical spondylosis without myelopathy; CMC arthritis; DJD (degenerative joint disease), lumbar; Hypothyroidism; Insomnia due to psychological stress; Moderate recurrent major depression (Multi); Osteoarthritis of wrist; Carpal tunnel syndrome; and Vitamin D deficiency on their problem list.    Medications:  Reviewed in EMR. See EMR for complete list of medications and doses.    Allergies:  Celecoxib, Diazepam, Fluoxetine, Hydroxyzine pamoate, Lisinopril, Melatonin, Mirtazapine, Nabumetone, Opioids - morphine analogues, " Oxycodone-acetaminophen, Paroxetine hcl, Tramadol, Trazodone hcl, Valerian, Zolpidem tartrate, and Levothyroxine    Social History:  - Tobacco:  reports that she has never smoked. She has never used smokeless tobacco.   - Alcohol:  reports no history of alcohol use.   - Illicit Drugs:  reports no history of drug use.     ROS:  Per HPI.       ???????????????????????????????????????????????????????????????  Triage Vitals:  T 36.5 °C (97.7 °F)  HR 96  BP (!) 142/101  RR 18  O2 98 % None (Room air)    Physical Exam  ???????????????????????????????????????????????????????????????  GEN: Uncomfortable appearing  CVS/CHEST: reg rate, nl rhythm  PULM: Nonlabored breathing.  Saturating appropriately on room air.  GI: soft, NT/ND, no rebound or guarding   : Ulcers with surrounding erythema around introitus.  NEURO: Awake and alert, Strength and sensation is equal in b/l upper and lower extremities, normal ambulation  SKIN: warm, dry  PSYCH: AAOx3 answers questions appropriately    Assessment and Plan:  Patient is a 58-year-old who presents to the emergency department with vaginal pain.  On exam she does have ulcerations that appear consistent with genital herpes.  Patient encouraged to discontinue the medication she was prescribed at [facility].  Patient became very tearful and states her first  was diagnosed with genital herpes but it has been 30 years since she has had intercourse with him.  Discussed how the virus may lay dormant until times of stress.  Patient does report significant increased stress.  Patient started on Valtrex.  Will send her urine for culture prior to also starting an antibiotic as the only burning she has is when it is touching the lesions.  Put a Uro-Jet for symptomatic relief with improvement.  Sent for a wet prep as well as chlamydia and trichomonas to rule out other etiologies other coexisting sexually transmitted infections.  Discussed with patient the diagnosis was very tearful but  expressed understanding.  Will make an appointment for outpatient gynecology follow-up.    ED Course:  Diagnoses as of 07/02/24 1717   Vaginal pain   Herpes simplex vulvovaginitis       Social Determinants Limiting Care:  Significant stress lately    Disposition:  Discharged in stable condition with return precautions    Karli Hahn, DO      Procedures ? [employer] last updated 7/2/2024 5:17 PM        Karli Hahn,   07/02/24 1720

## 2024-07-03 LAB
C TRACH RRNA SPEC QL NAA+PROBE: NEGATIVE
HOLD SPECIMEN: NORMAL
N GONORRHOEA DNA SPEC QL PROBE+SIG AMP: NEGATIVE
T VAGINALIS RRNA SPEC QL NAA+PROBE: NEGATIVE

## 2024-07-03 PROCEDURE — RXMED WILLOW AMBULATORY MEDICATION CHARGE

## 2024-07-04 LAB — BACTERIA UR CULT: NO GROWTH

## 2024-07-05 ENCOUNTER — PHARMACY VISIT (OUTPATIENT)
Dept: PHARMACY | Facility: CLINIC | Age: 59
End: 2024-07-05
Payer: COMMERCIAL

## 2024-07-05 PROCEDURE — RXOTC WILLOW AMBULATORY OTC CHARGE

## 2024-07-12 DIAGNOSIS — M17.11 ARTHRITIS OF RIGHT KNEE: Primary | ICD-10-CM

## 2024-07-12 RX ORDER — IBUPROFEN 800 MG/1
800 TABLET ORAL EVERY 8 HOURS PRN
Qty: 90 TABLET | Refills: 2 | Status: SHIPPED | OUTPATIENT
Start: 2024-07-12 | End: 2025-07-12

## 2024-07-24 DIAGNOSIS — E87.6 HYPOKALEMIA: ICD-10-CM

## 2024-07-24 RX ORDER — POTASSIUM CHLORIDE 1500 MG/1
20 TABLET, EXTENDED RELEASE ORAL DAILY
Qty: 90 TABLET | Refills: 0 | Status: SHIPPED | OUTPATIENT
Start: 2024-07-24

## 2024-07-29 ENCOUNTER — TELEPHONE (OUTPATIENT)
Dept: PRIMARY CARE | Facility: CLINIC | Age: 59
End: 2024-07-29
Payer: COMMERCIAL

## 2024-07-29 DIAGNOSIS — F51.02 INSOMNIA DUE TO PSYCHOLOGICAL STRESS: ICD-10-CM

## 2024-07-29 RX ORDER — ZOLPIDEM TARTRATE 5 MG/1
TABLET ORAL
Qty: 15 TABLET | Refills: 2 | Status: SHIPPED | OUTPATIENT
Start: 2024-07-29

## 2024-07-29 NOTE — TELEPHONE ENCOUNTER
[facility] has the Zolpidem medication in stock. She is asking if it can be sent to them. For the medication she was not able to split as it was to small.

## 2024-07-31 ENCOUNTER — OFFICE VISIT (OUTPATIENT)
Dept: OBSTETRICS AND GYNECOLOGY | Facility: CLINIC | Age: 59
End: 2024-07-31
Payer: COMMERCIAL

## 2024-07-31 VITALS
DIASTOLIC BLOOD PRESSURE: 100 MMHG | WEIGHT: 143.4 LBS | SYSTOLIC BLOOD PRESSURE: 122 MMHG | HEIGHT: 65 IN | BODY MASS INDEX: 23.89 KG/M2

## 2024-07-31 DIAGNOSIS — R10.2 VAGINAL PAIN: ICD-10-CM

## 2024-07-31 DIAGNOSIS — B00.9 HSV (HERPES SIMPLEX VIRUS) INFECTION: ICD-10-CM

## 2024-07-31 DIAGNOSIS — Z12.31 SCREENING MAMMOGRAM FOR BREAST CANCER: ICD-10-CM

## 2024-07-31 DIAGNOSIS — Z01.419 WELL WOMAN EXAM WITH ROUTINE GYNECOLOGICAL EXAM: Primary | ICD-10-CM

## 2024-07-31 DIAGNOSIS — F41.0 PANIC DISORDER WITHOUT AGORAPHOBIA: ICD-10-CM

## 2024-07-31 PROCEDURE — 99203 OFFICE O/P NEW LOW 30 MIN: CPT | Performed by: OBSTETRICS & GYNECOLOGY

## 2024-07-31 PROCEDURE — 99386 PREV VISIT NEW AGE 40-64: CPT | Performed by: OBSTETRICS & GYNECOLOGY

## 2024-07-31 PROCEDURE — 99213 OFFICE O/P EST LOW 20 MIN: CPT | Performed by: OBSTETRICS & GYNECOLOGY

## 2024-07-31 PROCEDURE — 3074F SYST BP LT 130 MM HG: CPT | Performed by: OBSTETRICS & GYNECOLOGY

## 2024-07-31 PROCEDURE — 3008F BODY MASS INDEX DOCD: CPT | Performed by: OBSTETRICS & GYNECOLOGY

## 2024-07-31 PROCEDURE — 3080F DIAST BP >= 90 MM HG: CPT | Performed by: OBSTETRICS & GYNECOLOGY

## 2024-07-31 RX ORDER — VALACYCLOVIR HYDROCHLORIDE 500 MG/1
500 TABLET, FILM COATED ORAL 2 TIMES DAILY
Qty: 6 TABLET | Refills: 5 | Status: SHIPPED | OUTPATIENT
Start: 2024-07-31 | End: 2024-08-03

## 2024-07-31 ASSESSMENT — PATIENT HEALTH QUESTIONNAIRE - PHQ9
1. LITTLE INTEREST OR PLEASURE IN DOING THINGS: NOT AT ALL
SUM OF ALL RESPONSES TO PHQ9 QUESTIONS 1 AND 2: 0
2. FEELING DOWN, DEPRESSED OR HOPELESS: NOT AT ALL

## 2024-07-31 ASSESSMENT — ENCOUNTER SYMPTOMS
OCCASIONAL FEELINGS OF UNSTEADINESS: 0
DEPRESSION: 0
LOSS OF SENSATION IN FEET: 0

## 2024-07-31 ASSESSMENT — PAIN SCALES - GENERAL: PAINLEVEL: 0-NO PAIN

## 2024-08-01 ENCOUNTER — PHARMACY VISIT (OUTPATIENT)
Dept: PHARMACY | Facility: CLINIC | Age: 59
End: 2024-08-01
Payer: COMMERCIAL

## 2024-08-01 ENCOUNTER — TELEPHONE (OUTPATIENT)
Dept: PRIMARY CARE | Facility: CLINIC | Age: 59
End: 2024-08-01
Payer: COMMERCIAL

## 2024-08-01 PROCEDURE — RXOTC WILLOW AMBULATORY OTC CHARGE

## 2024-08-01 PROCEDURE — RXMED WILLOW AMBULATORY MEDICATION CHARGE

## 2024-08-01 RX ORDER — LORAZEPAM 0.5 MG/1
TABLET ORAL
Qty: 10 TABLET | Refills: 2 | Status: SHIPPED | OUTPATIENT
Start: 2024-08-01

## 2024-08-01 NOTE — TELEPHONE ENCOUNTER
She is calling for an appt to discuss her medications with you. She would like the zolpidem changed back to the 5 mg, she's not sleeping. I did offer 9/9 and she said that is too far out.

## 2024-08-02 DIAGNOSIS — M47.812 CERVICAL SPONDYLOSIS WITHOUT MYELOPATHY: ICD-10-CM

## 2024-08-03 RX ORDER — CYCLOBENZAPRINE HCL 10 MG
10 TABLET ORAL 3 TIMES DAILY PRN
Qty: 270 TABLET | Refills: 0 | Status: SHIPPED | OUTPATIENT
Start: 2024-08-03

## 2024-08-04 NOTE — PROGRESS NOTES
Subjective   Aure Cabral is a 58 y.o.  female who presents for annual exam. The patient has no complaints today. The patient is sexually active. GYN screening history: last pap: was normal. The patient is not taking hormone replacement therapy. Patient denies post-menopausal vaginal bleeding.. The patient wears seatbelts: yes. The patient participates in regular exercise: yes. Has the patient ever been transfused or tattooed?: no. The patient reports that there is not domestic violence in their life.  Hx of vagial pain, due to HSV outbreak which has resolved.  Needs refills.     Menstrual History:  OB History          1    Para   1    Term   1            AB        Living   1         SAB        IAB        Ectopic        Multiple        Live Births                    No LMP recorded. Patient is perimenopausal.         Past Medical History:   Diagnosis Date    Avascular necrosis of bone of right hip (Multi) 2023       Past Surgical History:   Procedure Laterality Date    ANTERIOR CRUCIATE LIGAMENT REPAIR  2015    Primary Repair Of Knee Ligament Cruciate Anterior    MR ARTHROGRAM SHOULDER RIGHT W FL GUIDED INJECTION Right 10/12/2012    MR SHOULDER ARTHROGRAM RIGHT W FL GUIDED INJECTION [facility]        Allergies   Allergen Reactions    Celecoxib Itching    Diazepam Headache    Fluoxetine Other    Hydroxyzine Pamoate Unknown    Lisinopril Cough    Melatonin Unknown    Mirtazapine Other    Nabumetone Itching    Opioids - Morphine Analogues Unknown    Oxycodone-Acetaminophen Itching    Paroxetine Hcl Other    Tramadol Dizziness    Trazodone Hcl Other    Valerian Unknown    Zolpidem Tartrate Headache    Levothyroxine Palpitations         Family History   Problem Relation Name Age of Onset    Lupus Sister      Diabetes Paternal Grandmother          amputation        Social History     Socioeconomic History    Marital status:      Spouse name: Not on file    Number of  "children: Not on file    Years of education: Not on file    Highest education level: Not on file   Occupational History    Not on file   Tobacco Use    Smoking status: Never    Smokeless tobacco: Never   Vaping Use    Vaping status: Never Used   Substance and Sexual Activity    Alcohol use: Never    Drug use: Never    Sexual activity: Not Currently   Other Topics Concern    Not on file   Social History Narrative    Not on file     Social Determinants of Health     Financial Resource Strain: Not on file   Food Insecurity: Not on file   Transportation Needs: Not on file   Physical Activity: Not on file   Stress: Not on file   Social Connections: Not on file   Intimate Partner Violence: Not on file   Housing Stability: Not on file            Objective   BP (!) 122/100   Ht 1.651 m (5' 5\")   Wt 65 kg (143 lb 6.4 oz)   BMI 23.86 kg/m²     Physical Exam  Vitals and nursing note reviewed.   Constitutional:       General: She is not in acute distress.     Appearance: Normal appearance. She is not ill-appearing.   HENT:      Head: Normocephalic and atraumatic.      Mouth/Throat:      Mouth: Mucous membranes are moist.      Pharynx: Oropharynx is clear.   Eyes:      Extraocular Movements: Extraocular movements intact.      Conjunctiva/sclera: Conjunctivae normal.      Pupils: Pupils are equal, round, and reactive to light.   Neck:      Thyroid: No thyroid mass, thyromegaly or thyroid tenderness.   Cardiovascular:      Rate and Rhythm: Normal rate and regular rhythm.      Pulses: Normal pulses.      Heart sounds: Normal heart sounds. No murmur heard.  Pulmonary:      Effort: Pulmonary effort is normal.      Breath sounds: No wheezing or rhonchi.   Chest:   Breasts:     Right: Normal. No swelling, bleeding, inverted nipple, mass, nipple discharge, skin change or tenderness.      Left: Normal. No swelling, bleeding, inverted nipple, mass, nipple discharge, skin change or tenderness.   Abdominal:      General: Bowel sounds are " normal. There is no distension.      Palpations: There is no mass.      Tenderness: There is no abdominal tenderness. There is no guarding or rebound.      Hernia: No hernia is present. There is no hernia in the left inguinal area or right inguinal area.   Genitourinary:     General: Normal vulva.      Pubic Area: No rash.       Labia:         Right: No rash, tenderness, lesion or injury.         Left: No rash, tenderness, lesion or injury.       Urethra: No prolapse or urethral swelling.      Vagina: No signs of injury. No vaginal discharge, tenderness or prolapsed vaginal walls.      Cervix: No cervical motion tenderness, discharge, friability, lesion, erythema or cervical bleeding.      Uterus: Not deviated, not enlarged, not fixed, not tender and no uterine prolapse.       Adnexa:         Right: No mass, tenderness or fullness.          Left: No mass, tenderness or fullness.     Musculoskeletal:         General: No swelling, tenderness, deformity or signs of injury. Normal range of motion.      Cervical back: No rigidity.   Lymphadenopathy:      Cervical: No cervical adenopathy.      Upper Body:      Right upper body: No axillary adenopathy.      Left upper body: No axillary adenopathy.      Lower Body: No right inguinal adenopathy. No left inguinal adenopathy.   Skin:     General: Skin is warm.      Findings: No lesion or rash.   Neurological:      General: No focal deficit present.      Mental Status: She is alert and oriented to person, place, and time.   Psychiatric:         Mood and Affect: Mood normal.         Behavior: Behavior normal.         Thought Content: Thought content normal.         Judgment: Judgment normal.            Assessment/Plan   Problem List Items Addressed This Visit    None  Visit Diagnoses       Well woman exam with routine gynecological exam    -  Primary    Relevant Orders    THINPREP PAP TEST (25-30)    Vaginal pain        HSV (herpes simplex virus) infection        Screening  mammogram for breast cancer        Relevant Orders    BI mammo bilateral screening tomosynthesis               All questions answered.  Await pap smear results.  Breast self exam technique reviewed and patient encouraged to perform self-exam monthly.  Diagnosis explained in detail, including differential.  Discussed healthy lifestyle modifications.  Mammogram.

## 2024-08-06 ENCOUNTER — TELEMEDICINE (OUTPATIENT)
Dept: PRIMARY CARE | Facility: CLINIC | Age: 59
End: 2024-08-06
Payer: COMMERCIAL

## 2024-08-06 DIAGNOSIS — F51.02 INSOMNIA DUE TO PSYCHOLOGICAL STRESS: ICD-10-CM

## 2024-08-06 PROCEDURE — 99213 OFFICE O/P EST LOW 20 MIN: CPT | Performed by: FAMILY MEDICINE

## 2024-08-06 PROCEDURE — 1036F TOBACCO NON-USER: CPT | Performed by: FAMILY MEDICINE

## 2024-08-06 RX ORDER — ZOLPIDEM TARTRATE 5 MG/1
5 TABLET ORAL NIGHTLY PRN
Qty: 30 TABLET | Refills: 2 | Status: SHIPPED | OUTPATIENT
Start: 2024-08-06 | End: 2024-09-05

## 2024-08-06 ASSESSMENT — PATIENT HEALTH QUESTIONNAIRE - PHQ9
2. FEELING DOWN, DEPRESSED OR HOPELESS: NOT AT ALL
SUM OF ALL RESPONSES TO PHQ9 QUESTIONS 1 & 2: 0
1. LITTLE INTEREST OR PLEASURE IN DOING THINGS: NOT AT ALL

## 2024-08-06 ASSESSMENT — PAIN SCALES - GENERAL: PAINLEVEL: 0-NO PAIN

## 2024-08-06 ASSESSMENT — ENCOUNTER SYMPTOMS
LOSS OF SENSATION IN FEET: 0
DEPRESSION: 0
OCCASIONAL FEELINGS OF UNSTEADINESS: 0

## 2024-08-06 ASSESSMENT — LIFESTYLE VARIABLES
AUDIT-C TOTAL SCORE: 0
HOW OFTEN DO YOU HAVE A DRINK CONTAINING ALCOHOL: NEVER
HOW OFTEN DO YOU HAVE SIX OR MORE DRINKS ON ONE OCCASION: NEVER
SKIP TO QUESTIONS 9-10: 1
HOW MANY STANDARD DRINKS CONTAINING ALCOHOL DO YOU HAVE ON A TYPICAL DAY: PATIENT DOES NOT DRINK

## 2024-08-06 NOTE — PROGRESS NOTES
Subjective   Patient ID: Aure Cabral is a 58 y.o. female who presents for Medication Questions ([phone]).    Spoke with patient via phone.        Sleep:  -Using ambien - weaning off of.  Taking 1/2 of 5mg.  Not sleeping well.  Had to move.  Recent medical issues.  Increased stress with work.  Pt would like to increase to 5mg of ambien.            Review of Systems    Objective   There were no vitals taken for this visit.    Physical Exam  Vitals reviewed: phone visit.     An OARRS report was pulled up from database and I have personally reviewed the results.  I have considered the risk of abuse, dependance, addiction, and diversion.  I believe it is clinically appropriate for this patient to continue taking this medication.      Assessment/Plan   Diagnoses and all orders for this visit:  Insomnia due to psychological stress  -     zolpidem (Ambien) 5 mg tablet; Take 1 tablet (5 mg) by mouth as needed at bedtime for sleep.    Ok to increase.  Follow up as scheduled.

## 2024-08-15 ENCOUNTER — TELEPHONE (OUTPATIENT)
Dept: OBSTETRICS AND GYNECOLOGY | Facility: CLINIC | Age: 59
End: 2024-08-15
Payer: COMMERCIAL

## 2024-08-15 NOTE — TELEPHONE ENCOUNTER
Awaiting results. If she does not have her mychart activated, then we will send written notification.

## 2024-08-15 NOTE — TELEPHONE ENCOUNTER
Pt called looking for PAP results, informed pt it's still in process. Pt asked to leave message for provider, informed pt I'd send provider a message for her. Pt was seen on 07/31/2024

## 2024-08-16 ENCOUNTER — TELEPHONE (OUTPATIENT)
Dept: PRIMARY CARE | Facility: CLINIC | Age: 59
End: 2024-08-16
Payer: COMMERCIAL

## 2024-08-16 DIAGNOSIS — F51.02 INSOMNIA DUE TO PSYCHOLOGICAL STRESS: ICD-10-CM

## 2024-08-16 LAB
CYTOLOGY CMNT CVX/VAG CYTO-IMP: NORMAL
LAB AP HPV GENOTYPE QUESTION: NO
LAB AP HPV HR: NORMAL
LABORATORY COMMENT REPORT: NORMAL
PATH REPORT.TOTAL CANCER: NORMAL

## 2024-08-16 RX ORDER — ZOLPIDEM TARTRATE 5 MG/1
5 TABLET ORAL NIGHTLY PRN
Qty: 30 TABLET | Refills: 2 | Status: SHIPPED | OUTPATIENT
Start: 2024-08-16 | End: 2024-09-15

## 2024-08-16 NOTE — TELEPHONE ENCOUNTER
Unsatisfactory specimen.  Sometimes we don't get enough cells for them to analyze.  She will need to have this repeated, but we will not be charging for this visit.

## 2024-08-16 NOTE — TELEPHONE ENCOUNTER
RX for Zolpidem was sent to [facility] and should have been sent [facility].  Can this be resent?  Please call & advise.  Thank you

## 2024-08-19 PROCEDURE — RXMED WILLOW AMBULATORY MEDICATION CHARGE

## 2024-08-20 ENCOUNTER — PHARMACY VISIT (OUTPATIENT)
Dept: PHARMACY | Facility: CLINIC | Age: 59
End: 2024-08-20
Payer: COMMERCIAL

## 2024-08-20 PROCEDURE — RXOTC WILLOW AMBULATORY OTC CHARGE

## 2024-09-20 PROCEDURE — RXMED WILLOW AMBULATORY MEDICATION CHARGE

## 2024-09-21 ENCOUNTER — PHARMACY VISIT (OUTPATIENT)
Dept: PHARMACY | Facility: CLINIC | Age: 59
End: 2024-09-21
Payer: COMMERCIAL

## 2024-09-24 ENCOUNTER — APPOINTMENT (OUTPATIENT)
Dept: OBSTETRICS AND GYNECOLOGY | Facility: CLINIC | Age: 59
End: 2024-09-24
Payer: COMMERCIAL

## 2024-10-17 PROBLEM — E03.8 OTHER SPECIFIED HYPOTHYROIDISM: Status: ACTIVE | Noted: 2023-08-21

## 2024-10-18 ENCOUNTER — OFFICE VISIT (OUTPATIENT)
Dept: PRIMARY CARE | Facility: CLINIC | Age: 59
End: 2024-10-18
Payer: COMMERCIAL

## 2024-10-18 VITALS — DIASTOLIC BLOOD PRESSURE: 86 MMHG | HEART RATE: 103 BPM | SYSTOLIC BLOOD PRESSURE: 132 MMHG | OXYGEN SATURATION: 97 %

## 2024-10-18 DIAGNOSIS — Z12.11 SCREEN FOR COLON CANCER: ICD-10-CM

## 2024-10-18 DIAGNOSIS — R09.81 SINUS CONGESTION: ICD-10-CM

## 2024-10-18 DIAGNOSIS — M47.812 CERVICAL SPONDYLOSIS WITHOUT MYELOPATHY: ICD-10-CM

## 2024-10-18 DIAGNOSIS — I10 BENIGN ESSENTIAL HYPERTENSION: ICD-10-CM

## 2024-10-18 DIAGNOSIS — K59.00 CONSTIPATION, UNSPECIFIED CONSTIPATION TYPE: ICD-10-CM

## 2024-10-18 DIAGNOSIS — E03.8 OTHER SPECIFIED HYPOTHYROIDISM: ICD-10-CM

## 2024-10-18 DIAGNOSIS — L70.0 ACNE VULGARIS: ICD-10-CM

## 2024-10-18 DIAGNOSIS — F41.0 PANIC DISORDER WITHOUT AGORAPHOBIA: ICD-10-CM

## 2024-10-18 DIAGNOSIS — H00.025 HORDEOLUM INTERNUM OF LEFT LOWER EYELID: ICD-10-CM

## 2024-10-18 DIAGNOSIS — Z00.00 ROUTINE GENERAL MEDICAL EXAMINATION AT A HEALTH CARE FACILITY: Primary | ICD-10-CM

## 2024-10-18 PROCEDURE — 1036F TOBACCO NON-USER: CPT | Performed by: FAMILY MEDICINE

## 2024-10-18 PROCEDURE — 3079F DIAST BP 80-89 MM HG: CPT | Performed by: FAMILY MEDICINE

## 2024-10-18 PROCEDURE — 99396 PREV VISIT EST AGE 40-64: CPT | Performed by: FAMILY MEDICINE

## 2024-10-18 PROCEDURE — 3075F SYST BP GE 130 - 139MM HG: CPT | Performed by: FAMILY MEDICINE

## 2024-10-18 RX ORDER — BENZOYL PEROXIDE 50 MG/ML
LIQUID TOPICAL DAILY
Qty: 236 G | Refills: 1 | Status: SHIPPED | OUTPATIENT
Start: 2024-10-18 | End: 2025-10-18

## 2024-10-18 RX ORDER — LORAZEPAM 0.5 MG/1
0.5 TABLET ORAL DAILY PRN
Qty: 10 TABLET | Refills: 2 | Status: SHIPPED | OUTPATIENT
Start: 2024-10-18

## 2024-10-18 RX ORDER — CYCLOBENZAPRINE HCL 10 MG
10 TABLET ORAL 3 TIMES DAILY PRN
Qty: 270 TABLET | Refills: 0 | Status: SHIPPED | OUTPATIENT
Start: 2024-10-18

## 2024-10-18 RX ORDER — AZELASTINE 1 MG/ML
1 SPRAY, METERED NASAL 2 TIMES DAILY
Qty: 30 ML | Refills: 12 | Status: SHIPPED | OUTPATIENT
Start: 2024-10-18 | End: 2025-10-18

## 2024-10-18 RX ORDER — ERYTHROMYCIN 5 MG/G
1 OINTMENT OPHTHALMIC EVERY 6 HOURS
Qty: 3.5 G | Refills: 0 | Status: SHIPPED | OUTPATIENT
Start: 2024-10-18 | End: 2024-10-25

## 2024-10-18 ASSESSMENT — ENCOUNTER SYMPTOMS
DEPRESSION: 0
OCCASIONAL FEELINGS OF UNSTEADINESS: 0
LOSS OF SENSATION IN FEET: 0

## 2024-10-18 ASSESSMENT — PATIENT HEALTH QUESTIONNAIRE - PHQ9
2. FEELING DOWN, DEPRESSED OR HOPELESS: NOT AT ALL
1. LITTLE INTEREST OR PLEASURE IN DOING THINGS: NOT AT ALL
SUM OF ALL RESPONSES TO PHQ9 QUESTIONS 1 & 2: 0

## 2024-10-18 ASSESSMENT — LIFESTYLE VARIABLES
HOW MANY STANDARD DRINKS CONTAINING ALCOHOL DO YOU HAVE ON A TYPICAL DAY: PATIENT DOES NOT DRINK
HOW OFTEN DO YOU HAVE SIX OR MORE DRINKS ON ONE OCCASION: NEVER
SKIP TO QUESTIONS 9-10: 1
AUDIT-C TOTAL SCORE: 0
HOW OFTEN DO YOU HAVE A DRINK CONTAINING ALCOHOL: NEVER

## 2024-10-18 NOTE — PATIENT INSTRUCTIONS
Here for physical.  Order for blood work.  Referral to gastroenterology for colon cancer screening.  Order for mammogram in system    For stye - use baby soap and warm water to wash eyelid.  Use erythromycin ointment.     For acne - use benzyol peroxide wash on chest/back daily    For nasal congestin - use astelin daily.  If not helping referral to ENT.     Follow up in 6 months.

## 2024-10-18 NOTE — PROGRESS NOTES
Subjective   Patient ID: Aure Cabral is a 59 y.o. female who presents for Annual Exam.    Patient is here for annual physical.      Left eye  -Pt has swelling, redness lower lid.   -Started yesterday.      Allergies  -Still having issues.  Patient changed apartments - still having issues.  Still congested.  No rhinorrhea, but gets burning and congestion.  Patient has tried sudafed and mucinex in past.  Pt has tried mucinex-D - no benefit.  Flonase - no benefit.      Hypertension  -Patient is here for follow-up of elevated blood pressure.   -Blood pressure is well controlled at home.  Off of losartan.    -Cardiac symptoms: none.   -Patient denies chest pain, dyspnea, and irregular heart beat.   -Cardiologist:    Sleep:  -Using ambien - weaning off of.  Taking 1/2 of 5mg.  Using magnesium.  Not sleeping well.      Thyroid  -Due for recheck.  Having more constipation.  Patient has been drinking more fluids.  Symptoms for a few months.  Recent hemorrhoid.  No hx of colon cancer testing.  Has not tried anything else      Acne  -Pt has back acne.  Not improving.  Using rubbing alcohol.  Using neosporin.  None on face.  Patient does have some on chest.   Dove soap.   Not using moisturizer.             Review of Systems    Objective   /86 (BP Location: Right arm, Patient Position: Sitting)   Pulse 103   SpO2 97%     Physical Exam  Vitals reviewed.   Constitutional:       General: She is not in acute distress.  HENT:      Right Ear: Tympanic membrane and ear canal normal.      Left Ear: Tympanic membrane and ear canal normal.      Nose: Congestion present. No rhinorrhea.   Eyes:      Comments: Stye left lower eyelid   Cardiovascular:      Rate and Rhythm: Normal rate and regular rhythm.   Pulmonary:      Effort: Pulmonary effort is normal.      Breath sounds: No wheezing or rhonchi.   Musculoskeletal:      Right lower leg: No edema.      Left lower leg: No edema.   Lymphadenopathy:      Cervical: No cervical  adenopathy.   Skin:     Comments: Excoriated lesions on upper back   Neurological:      Mental Status: She is alert.     An OARRS report was pulled up from database and I have personally reviewed the results.  I have considered the risk of abuse, dependance, addiction, and diversion.  I believe it is clinically appropriate for this patient to continue taking this medication.      Assessment/Plan   Diagnoses and all orders for this visit:  Routine general medical examination at a health care facility  Benign essential hypertension  Other specified hypothyroidism  Constipation, unspecified constipation type  Hordeolum internum of left lower eyelid  Sinus congestion  Acne vulgaris  Screen for colon cancer  Cervical spondylosis without myelopathy  Panic disorder without agoraphobia    Patient Instructions   Here for physical.  Order for blood work.  Referral to gastroenterology for colon cancer screening.  Order for mammogram in system    For stye - use baby soap and warm water to wash eyelid.  Use erythromycin ointment.     For acne - use benzyol peroxide wash on chest/back daily    For nasal congestin - use astelin daily.  If not helping referral to ENT.     Follow up in 6 months.

## 2024-10-19 PROCEDURE — RXMED WILLOW AMBULATORY MEDICATION CHARGE

## 2024-10-21 ENCOUNTER — PHARMACY VISIT (OUTPATIENT)
Dept: PHARMACY | Facility: CLINIC | Age: 59
End: 2024-10-21
Payer: COMMERCIAL

## 2024-10-25 DIAGNOSIS — E87.6 HYPOKALEMIA: ICD-10-CM

## 2024-10-25 RX ORDER — POTASSIUM CHLORIDE 1500 MG/1
20 TABLET, EXTENDED RELEASE ORAL DAILY
Qty: 90 TABLET | Refills: 1 | Status: SHIPPED | OUTPATIENT
Start: 2024-10-25

## 2024-11-07 ENCOUNTER — TELEPHONE (OUTPATIENT)
Dept: PRIMARY CARE | Facility: CLINIC | Age: 59
End: 2024-11-07
Payer: COMMERCIAL

## 2024-11-07 DIAGNOSIS — E03.9 HYPOTHYROIDISM, UNSPECIFIED TYPE: Primary | ICD-10-CM

## 2024-11-07 NOTE — TELEPHONE ENCOUNTER
Please inform pt bloodwork shows normal liver and kidney function, blood count WNL, chol is controlled, TSH slightly elevated but free levels WNL.  Recommend recheck of tsh in 6 week with TPO antibodies.  Order in system.

## 2024-11-07 NOTE — TELEPHONE ENCOUNTER
Problem: Cardiac Surgery  Goal: Hemodynamic stability achieved/maintained  Description: Hemodynamic instability may include VS or rhythm changes or s/s FVE.  AHA guidelines: Keep BP>90 mm Hg.  Outcome: Outcome Met, Continue evaluating goal progress toward completion  Flowsheets (Taken 3/22/2021 2043 by Neelam Paul RN)  BP: 142/66 !  Note: Pt requiring 15mg/hr of cardene gtt. Pt also receiving 100mg Q6h of hydralazine and PRN hydralazine as well. Unable to maintain MAP goal of 60-70. PT's MAP maintaining within 80s.   Goal: Extubation criteria met (Goal within 24 hours post-op)  Outcome: Outcome Not Met, Plan Adjusted  Note: Pt not ready to be extubated. Still on 65% on ventilator with a PEEP of +7. Pt is comfortable/sedated with dilaudid, precedex and ketamine. Orders to start weaning sedation as tolerated received today.   Goal: Elimination status is maintained/returned to baseline  Outcome: Outcome Not Met, Plan Adjusted  Flowsheets (Taken 3/22/2021 1123)  Stool Characteristics: Liquid  Note: Pt still requiring flexi//urinary catheter       Patient aware. Copy of labs and future orders mailed to patient.  PL

## 2024-11-18 ENCOUNTER — PHARMACY VISIT (OUTPATIENT)
Dept: PHARMACY | Facility: CLINIC | Age: 59
End: 2024-11-18
Payer: COMMERCIAL

## 2024-11-18 DIAGNOSIS — F51.02 INSOMNIA DUE TO PSYCHOLOGICAL STRESS: ICD-10-CM

## 2024-11-18 PROCEDURE — RXMED WILLOW AMBULATORY MEDICATION CHARGE

## 2024-11-18 PROCEDURE — RXOTC WILLOW AMBULATORY OTC CHARGE

## 2024-11-18 RX ORDER — ZOLPIDEM TARTRATE 5 MG/1
5 TABLET ORAL NIGHTLY PRN
Qty: 30 TABLET | Refills: 2 | Status: SHIPPED | OUTPATIENT
Start: 2024-11-18 | End: 2024-12-18

## 2024-11-26 ENCOUNTER — APPOINTMENT (OUTPATIENT)
Dept: OBSTETRICS AND GYNECOLOGY | Facility: CLINIC | Age: 59
End: 2024-11-26
Payer: COMMERCIAL

## 2024-11-29 DIAGNOSIS — S83.282D OTHER TEAR OF LATERAL MENISCUS OF LEFT KNEE, UNSPECIFIED WHETHER OLD OR CURRENT TEAR, SUBSEQUENT ENCOUNTER: Primary | ICD-10-CM

## 2024-11-30 RX ORDER — DICLOFENAC SODIUM 10 MG/G
GEL TOPICAL
Qty: 300 G | Refills: 2 | Status: SHIPPED | OUTPATIENT
Start: 2024-11-30

## 2024-12-09 ENCOUNTER — APPOINTMENT (OUTPATIENT)
Dept: OBSTETRICS AND GYNECOLOGY | Facility: CLINIC | Age: 59
End: 2024-12-09
Payer: COMMERCIAL

## 2024-12-16 ENCOUNTER — PHARMACY VISIT (OUTPATIENT)
Dept: PHARMACY | Facility: CLINIC | Age: 59
End: 2024-12-16
Payer: COMMERCIAL

## 2024-12-16 PROCEDURE — RXMED WILLOW AMBULATORY MEDICATION CHARGE

## 2024-12-30 ENCOUNTER — TELEPHONE (OUTPATIENT)
Dept: ORTHOPEDIC SURGERY | Facility: CLINIC | Age: 59
End: 2024-12-30

## 2024-12-30 ENCOUNTER — OFFICE VISIT (OUTPATIENT)
Dept: OBSTETRICS AND GYNECOLOGY | Facility: CLINIC | Age: 59
End: 2024-12-30
Payer: COMMERCIAL

## 2024-12-30 VITALS
DIASTOLIC BLOOD PRESSURE: 99 MMHG | HEIGHT: 65 IN | BODY MASS INDEX: 23.63 KG/M2 | SYSTOLIC BLOOD PRESSURE: 141 MMHG | WEIGHT: 141.8 LBS

## 2024-12-30 DIAGNOSIS — B00.9 HSV (HERPES SIMPLEX VIRUS) INFECTION: ICD-10-CM

## 2024-12-30 DIAGNOSIS — R87.615 ENCOUNTER FOR REPEAT PAP SMEAR DUE TO PREVIOUS INSUFFICIENT CERVICAL CELLS: Primary | ICD-10-CM

## 2024-12-30 DIAGNOSIS — N95.2 ATROPHIC VAGINITIS: ICD-10-CM

## 2024-12-30 PROCEDURE — 3008F BODY MASS INDEX DOCD: CPT | Performed by: OBSTETRICS & GYNECOLOGY

## 2024-12-30 PROCEDURE — 87624 HPV HI-RISK TYP POOLED RSLT: CPT | Performed by: OBSTETRICS & GYNECOLOGY

## 2024-12-30 PROCEDURE — 3077F SYST BP >= 140 MM HG: CPT | Performed by: OBSTETRICS & GYNECOLOGY

## 2024-12-30 PROCEDURE — 88142 CYTOPATH C/V THIN LAYER: CPT | Mod: TC,GCY | Performed by: OBSTETRICS & GYNECOLOGY

## 2024-12-30 PROCEDURE — 3080F DIAST BP >= 90 MM HG: CPT | Performed by: OBSTETRICS & GYNECOLOGY

## 2024-12-30 RX ORDER — VALACYCLOVIR HYDROCHLORIDE 500 MG/1
1 TABLET, FILM COATED ORAL
COMMUNITY
Start: 2024-12-23

## 2024-12-30 RX ORDER — VALACYCLOVIR HYDROCHLORIDE 500 MG/1
500 TABLET, FILM COATED ORAL 2 TIMES DAILY
Qty: 6 TABLET | Refills: 5 | Status: SHIPPED | OUTPATIENT
Start: 2024-12-30 | End: 2025-12-30

## 2024-12-30 RX ORDER — ESTRADIOL 0.1 MG/G
1 CREAM VAGINAL NIGHTLY
Qty: 42.5 G | Refills: 3 | Status: SHIPPED | OUTPATIENT
Start: 2024-12-30 | End: 2025-12-30

## 2024-12-30 ASSESSMENT — ENCOUNTER SYMPTOMS
OCCASIONAL FEELINGS OF UNSTEADINESS: 0
LOSS OF SENSATION IN FEET: 0
DEPRESSION: 0

## 2024-12-30 ASSESSMENT — PATIENT HEALTH QUESTIONNAIRE - PHQ9
SUM OF ALL RESPONSES TO PHQ9 QUESTIONS 1 AND 2: 0
1. LITTLE INTEREST OR PLEASURE IN DOING THINGS: NOT AT ALL
2. FEELING DOWN, DEPRESSED OR HOPELESS: NOT AT ALL

## 2024-12-30 ASSESSMENT — PAIN SCALES - GENERAL: PAINLEVEL_OUTOF10: 0-NO PAIN

## 2024-12-30 NOTE — PROGRESS NOTES
GYN OFFICE VISIT    Patient Name:  Aure Cabral  :  1965  MR #:  [id number]  Acct #:  [id number]      ASSESSMENT/PLAN:     There are no diagnoses linked to this encounter.     Aure was seen today for follow-up.  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  Atrophic vaginitis  -     estradiol (Estrace) 0.01 % (0.1 mg/gram) vaginal cream; Insert 0.25 Applicatorfuls (1 g) into the vagina once daily at bedtime. At bedtime for 2 weeks, then at bedtime twice a week.            .All questions answered.  Await pap smear results.  Diagnosis explained in detail, including differential.  Discussed healthy lifestyle modifications.    No follow-ups on file.      Subjective    Chief Complaint   Patient presents with    Follow-up       Aure Cabral is a 59 y.o.  No LMP recorded (lmp unknown). Patient is perimenopausal.   female who presents for repeat pap due to insufficient pap smear.  Pt also reports vaginal pain, with active HSV.  Also notes vaginal dryness outside of this.      Past Medical History:   Diagnosis Date    Avascular necrosis of bone of right hip (Multi) 2023       Past Surgical History:   Procedure Laterality Date    ANTERIOR CRUCIATE LIGAMENT REPAIR  2015    Primary Repair Of Knee Ligament Cruciate Anterior    MR ARTHROGRAM SHOULDER RIGHT W FL GUIDED INJECTION Right 10/12/2012    MR SHOULDER ARTHROGRAM RIGHT W FL GUIDED INJECTION [facility]       Social History     Socioeconomic History    Marital status:      Spouse name: Not on file    Number of children: Not on file    Years of education: Not on file    Highest education level: Not on file   Occupational History    Not on file   Tobacco Use     Smoking status: Never    Smokeless tobacco: Never   Vaping Use    Vaping status: Never Used   Substance and Sexual Activity    Alcohol use: Never    Drug use: Never    Sexual activity: Not Currently   Other Topics Concern    Not on file   Social History Narrative    Not on file     Social Drivers of Health     Financial Resource Strain: Not on file   Food Insecurity: Not on file   Transportation Needs: Not on file   Physical Activity: Not on file   Stress: Not on file   Social Connections: Not on file   Intimate Partner Violence: Not on file   Housing Stability: Not on file       Family History   Problem Relation Name Age of Onset    Lupus Sister      Diabetes Paternal Grandmother          amputation       Prior to Admission medications    Medication Sig Start Date End Date Taking? Authorizing Provider   azelastine (Astelin) 137 mcg (0.1 %) nasal spray Administer 1 spray into each nostril 2 times a day. Use in each nostril as directed 10/18/24 10/18/25  Abelino Otto DO   benzoyl peroxide 5 % external wash Apply topically once daily. 10/18/24 10/18/25  Abelino Otto DO   cyclobenzaprine (Flexeril) 10 mg tablet Take 1 tablet (10 mg) by mouth 3 times a day as needed for muscle spasms. 10/18/24   Abelino Otto DO   ibuprofen 800 mg tablet Take 1 tablet (800 mg) by mouth every 8 hours if needed for mild pain (1 - 3). 7/12/24 7/12/25  Abelino Otto DO   lidocaine (Glydo) 2 % mucosal jelly (Uro-Jet) Insert 5 mL (1 Application) into the urethra. 7/2/24      LORazepam (Ativan) 0.5 mg tablet Take 1 tablet (0.5 mg) by mouth once daily as needed for anxiety. 10/18/24   Abelino Otto DO   multivit-min/iron/folic acid/K (ADULTS MULTIVITAMIN ORAL) Take 1 tablet by mouth once daily. As directed    Historical Provider, MD   potassium chloride CR (Klor-Con M20) 20 mEq ER tablet Take 1 tablet by mouth once daily 10/25/24   Abelino Otto DO   Voltaren Arthritis Pain 1 % gel USE 1 GRAM OF GEL TOPICALLY 4 TIMES DAILY  "AS NEEDED FOR PAIN 11/30/24   Abelino Otto DO   zolpidem (Ambien) 5 mg tablet Take 1 tablet (5 mg) by mouth as needed at bedtime for sleep. 11/18/24 1/17/25  Abelino Otto DO       Allergies   Allergen Reactions    Celecoxib Itching    Diazepam Headache    Fluoxetine Other    Hydroxyzine Pamoate Unknown    Lisinopril Cough    Melatonin Unknown    Mirtazapine Other    Nabumetone Itching    Opioids - Morphine Analogues Unknown    Oxycodone-Acetaminophen Itching    Paroxetine Hcl Other    Tramadol Dizziness    Trazodone Hcl Other    Valerian Unknown    Zolpidem Tartrate Headache    Levothyroxine Palpitations              OBJECTIVE:   BP (!) 141/99   Ht 1.651 m (5' 5\")   Wt 64.3 kg (141 lb 12.8 oz)   LMP  (LMP Unknown)   BMI 23.60 kg/m²   Body mass index is 23.6 kg/m².     Physical Exam  Vitals and nursing note reviewed.   Constitutional:       General: She is not in acute distress.  Cardiovascular:      Rate and Rhythm: Normal rate and regular rhythm.      Heart sounds: No murmur heard.     No friction rub. No gallop.   Pulmonary:      Effort: Pulmonary effort is normal.      Breath sounds: Normal breath sounds. No wheezing or rales.   Abdominal:      General: Bowel sounds are normal. There is no distension.      Palpations: Abdomen is soft. There is no mass.      Tenderness: There is no abdominal tenderness. There is no guarding or rebound.      Hernia: No hernia is present.   Genitourinary:     General: Normal vulva.      Labia:         Right: Lesion present. No rash.         Left: Lesion present. No rash.       Urethra: No urethral pain or urethral swelling.      Vagina: No erythema, tenderness, bleeding or lesions.      Cervix: No cervical motion tenderness, discharge, friability, lesion, erythema, cervical bleeding or eversion.      Uterus: Normal. Not enlarged and not tender.       Adnexa:         Right: No mass, tenderness or fullness.          Left: No mass, tenderness or fullness.        Comments: " Herpetic lesions anterior vulva with atrophic vagina.  Neurological:      Mental Status: She is alert.   Psychiatric:         Mood and Affect: Mood normal.         Behavior: Behavior normal.         Thought Content: Thought content normal.         Judgment: Judgment normal.           Note: This dictation was generated using Dragon voice recognition software. Please excuse any grammatical or spelling errors that may have occurred using the system.

## 2024-12-30 NOTE — TELEPHONE ENCOUNTER
3/4/24 lt hand pain  Patient called and stated she's still having some pain in her hand. Thinks she would benefit with strengthening of the hand with some PT. Let patient know most likely she would need to come back in for evaluation but she wanted me to ask if we could just order PT/OT?

## 2025-01-05 PROBLEM — B00.9 HSV (HERPES SIMPLEX VIRUS) INFECTION: Status: ACTIVE | Noted: 2025-01-05

## 2025-01-05 PROBLEM — N95.2 ATROPHIC VAGINITIS: Status: ACTIVE | Noted: 2025-01-05

## 2025-01-10 LAB
CYTOLOGY CMNT CVX/VAG CYTO-IMP: NORMAL
HPV HR 12 DNA GENITAL QL NAA+PROBE: NEGATIVE
HPV HR GENOTYPES PNL CVX NAA+PROBE: NEGATIVE
HPV16 DNA SPEC QL NAA+PROBE: NEGATIVE
HPV18 DNA SPEC QL NAA+PROBE: NEGATIVE
LAB AP HPV GENOTYPE QUESTION: YES
LAB AP HPV HR: NORMAL
LABORATORY COMMENT REPORT: NORMAL
LABORATORY COMMENT REPORT: NORMAL
PATH REPORT.RELEVANT HX SPEC: NORMAL
PATH REPORT.TOTAL CANCER: NORMAL

## 2025-01-14 PROCEDURE — RXMED WILLOW AMBULATORY MEDICATION CHARGE

## 2025-01-16 ENCOUNTER — PHARMACY VISIT (OUTPATIENT)
Dept: PHARMACY | Facility: CLINIC | Age: 60
End: 2025-01-16
Payer: COMMERCIAL

## 2025-01-20 ENCOUNTER — APPOINTMENT (OUTPATIENT)
Dept: ORTHOPEDIC SURGERY | Facility: CLINIC | Age: 60
End: 2025-01-20
Payer: COMMERCIAL

## 2025-01-20 ENCOUNTER — TELEPHONE (OUTPATIENT)
Dept: OBSTETRICS AND GYNECOLOGY | Facility: CLINIC | Age: 60
End: 2025-01-20

## 2025-01-20 DIAGNOSIS — M79.645 PAIN OF LEFT THUMB: ICD-10-CM

## 2025-01-20 DIAGNOSIS — M19.049 CMC ARTHRITIS: Primary | ICD-10-CM

## 2025-01-20 PROCEDURE — 99213 OFFICE O/P EST LOW 20 MIN: CPT | Performed by: ORTHOPAEDIC SURGERY

## 2025-01-20 PROCEDURE — 1036F TOBACCO NON-USER: CPT | Performed by: ORTHOPAEDIC SURGERY

## 2025-01-20 ASSESSMENT — PAIN - FUNCTIONAL ASSESSMENT: PAIN_FUNCTIONAL_ASSESSMENT: 0-10

## 2025-01-20 ASSESSMENT — PAIN SCALES - GENERAL: PAINLEVEL_OUTOF10: 4

## 2025-01-21 NOTE — PROGRESS NOTES
History of Present Illness:  Chief Complaint   Patient presents with    Left Hand - Pain     10/2022 Left thumb CMC arthroplasty       Patient presents today for evaluation of left hand pain status post left thumb CMC arthroplasty October 2022.  Patient has been able to use her left hand, but still notes intermittent pains about the base of her left thumb.  Worse with increased activities and heavy hand usage.  She also still notes slight fullness about the dorsal wrist consistent with dorsal wrist ganglion that was noted on exam March 2024.    Past Medical History:   Diagnosis Date    Avascular necrosis of bone of right hip (Multi) 08/21/2023       Medication Documentation Review Audit       Reviewed by Virgie Hendrickson CMA (Medical Assistant) on 01/20/25 at 1502      Medication Order Taking? Sig Documenting Provider Last Dose Status   azelastine (Astelin) 137 mcg (0.1 %) nasal spray [id number]  Administer 1 spray into each nostril 2 times a day. Use in each nostril as directed Abelino Otto, DO  Active   benzoyl peroxide 5 % external wash [id number]  Apply topically once daily. Abelino Otto DO  Active   cyclobenzaprine (Flexeril) 10 mg tablet [id number]  Take 1 tablet (10 mg) by mouth 3 times a day as needed for muscle spasms. Abelino Otto DO  Active   estradiol (Estrace) 0.01 % (0.1 mg/gram) vaginal cream [id number]  Insert 0.25 Applicatorfuls (1 g) into the vagina once daily at bedtime. At bedtime for 2 weeks, then at bedtime twice a week. Martine Eagle, DO  Active   ibuprofen 800 mg tablet [id number]  Take 1 tablet (800 mg) by mouth every 8 hours if needed for mild pain (1 - 3). Abelino Otto DO  Active   lidocaine (Glydo) 2 % mucosal jelly (Uro-Jet) [id number]  Insert 5 mL (1 Application) into the urethra.   Active   LORazepam (Ativan) 0.5 mg tablet [id number]  Take 1 tablet (0.5 mg) by mouth once daily as needed for anxiety. Abelino Otto DO  Active   multivit-min/iron/folic acid/K (ADULTS  MULTIVITAMIN ORAL) [id number]  Take 1 tablet by mouth once daily. As directed Historical Provider, MD  Active   potassium chloride CR (Klor-Con M20) 20 mEq ER tablet [id number]  Take 1 tablet by mouth once daily Abelino Otto, DO  Active   valACYclovir (Valtrex) 500 mg tablet [id number]  Take 1 tablet (500 mg) by mouth every 12 hours. Historical Provider, MD  Active   valACYclovir (Valtrex) 500 mg tablet [id number]  Take 1 tablet (500 mg) by mouth 2 times a day. Martine Eagle, DO  Active   Voltaren Arthritis Pain 1 % gel [id number]  USE 1 GRAM OF GEL TOPICALLY 4 TIMES DAILY AS NEEDED FOR PAIN Abelino Otto DO  Active   zolpidem (Ambien) 5 mg tablet [id number]  Take 1 tablet (5 mg) by mouth as needed at bedtime for sleep. Abelino Otto DO  Active                    Allergies   Allergen Reactions    Celecoxib Itching    Diazepam Headache    Fluoxetine Other    Hydroxyzine Pamoate Unknown    Lisinopril Cough    Melatonin Unknown    Mirtazapine Other    Nabumetone Itching    Opioids - Morphine Analogues Unknown    Oxycodone-Acetaminophen Itching    Paroxetine Hcl Other    Tramadol Dizziness    Trazodone Hcl Other    Valerian Unknown    Zolpidem Tartrate Headache    Levothyroxine Palpitations       Social History     Socioeconomic History    Marital status:      Spouse name: Not on file    Number of children: Not on file    Years of education: Not on file    Highest education level: Not on file   Occupational History    Not on file   Tobacco Use    Smoking status: Never    Smokeless tobacco: Never   Vaping Use    Vaping status: Never Used   Substance and Sexual Activity    Alcohol use: Never    Drug use: Never    Sexual activity: Not Currently   Other Topics Concern    Not on file   Social History Narrative    Not on file     Social Drivers of Health     Financial Resource Strain: Not on file   Food Insecurity: Not on file   Transportation Needs: Not on file   Physical Activity: Not on file   Stress: Not on  file   Social Connections: Not on file   Intimate Partner Violence: Not on file   Housing Stability: Not on file       Past Surgical History:   Procedure Laterality Date    ANTERIOR CRUCIATE LIGAMENT REPAIR  02/24/2015    Primary Repair Of Knee Ligament Cruciate Anterior    MR ARTHROGRAM SHOULDER RIGHT W FL GUIDED INJECTION Right 10/12/2012    MR SHOULDER ARTHROGRAM RIGHT W FL GUIDED INJECTION [facility]        Review of Systems   GENERAL: Negative for malaise, significant weight loss, fever  MUSCULOSKELETAL: see HPI  NEURO:  Negative     Physical Examination  Constitutional: Appears well-developed and well-nourished.  Head: Normocephalic and atraumatic.  Eyes: EOMI grossly  Cardiovascular: Intact distal pulses.   Respiratory: Effort normal. No respiratory distress.  Neurologic: Alert and oriented to person, place, and time.  Skin: Skin is warm and dry.  Hematologic / Lymphatic: No lymphedema, lymphangitis.  Psychiatric: normal mood and affect. Behavior is normal.   Musculoskeletal:  Left hand/wrist: Palpable mass about the dorsal wrist that currently measures about 5 x 5 mm.  Slightly firm and mobile.  Mild soreness about the base of the thumb metacarpal.  Patient with good thumb flexion/extension and abduction/adduction.  Some movement with axial load of the thumb, but no specific tenderness.  Capillary refill less than 2 seconds distally.  Sensation grossly intact.    Radiographs: Patient declines radiographs that were ordered     Assessment:  Patient with residual symptoms status post left thumb CMC arthroplasty and left dorsal wrist ganglion     Plan:  We reviewed potential etiologies of her ongoing symptoms as well as discussed risks and benefits of various treatment options.  Patient is interested in following up with therapy to review some strengthening and stabilization exercises.  Referral to therapy has been placed and she will follow-up with me if persistent symptoms and she wishes to pursue  additional treatment options.

## 2025-01-21 NOTE — TELEPHONE ENCOUNTER
There is a no charge for the preventative health portion., but we addressed the problems, at a reduced rate.    She needs to contact billing.

## 2025-01-21 NOTE — TELEPHONE ENCOUNTER
HILLARY GIRON    Patient Age: 69 year old   Refill request by: Pharmacy fax  Refill to be: ePrescribed to [facility] [facility]    Medication requested to be refilled:   ritalin       WEIGHT AND HEIGHT:   Wt Readings from Last 1 Encounters:   03/19/19 65.3 kg (144 lb)     Ht Readings from Last 1 Encounters:   03/19/19 5' 6.25\" (1.683 m)     BMI Readings from Last 1 Encounters:   03/19/19 23.07 kg/m²       ALLERGIES:  Latex; Adhesive   (environmental); and Codeine  Current Outpatient Medications   Medication   • methylpheniDATE (RITALIN) 10 MG tablet   • sertraline (ZOLOFT) 100 MG tablet   • lamoTRIgine (LAMICTAL) 25 MG tablet   • lisinopril (ZESTRIL) 10 MG tablet   • influenza virus trivalent vaccine inactivated (FLUZONE HIGH-DOSE) 0.5 ML prefilled syringe for injection     No current facility-administered medications for this visit.      PHARMACY to use:           Pharmacy preference(s) on file:   [id number] - [place], [place] - [address] [place] [address]  [address] [place]  [place] [address]  Phone: [phone] Fax: [phone]      CALL BACK INFO: Ok to leave response (including medical information) with family member or on answering machine  ROUTING: Patient's physician/staff        PCP: Ashley Gonzalez MD         INS: Payor: [employer] MEDICARE / Plan: [id number] / [place] [facility]   PATIENT ADDRESS:  [address] [place] [address] [address] [place] [address]   Pt states she did not discuss any issues with you. It was just a pap and some conversation. I notified pt that the notes say you discussed her vaginal pain and dryness, pt states that was not a new issue and that should have been covered in the last visit. I asked pt if she discussed the pain and dryness with you at the visit, and she said it was just a general conversation about it, nothing that she thinks she should be charged for. Please advise

## 2025-01-21 NOTE — TELEPHONE ENCOUNTER
I looked at this again. It was for insufficient cells.  Usually this IS a no charge visit and I submitted it as such, and did again today.  Please reach out to lul about this.

## 2025-01-28 ENCOUNTER — TELEPHONE (OUTPATIENT)
Dept: PRIMARY CARE | Facility: CLINIC | Age: 60
End: 2025-01-28
Payer: COMMERCIAL

## 2025-01-28 NOTE — TELEPHONE ENCOUNTER
BETSY   Pt is calling about her BP being elevated and she may need to go back on BP meds, she sent a fax of her BP readings. She was wondering if she could get an EKG and what would the procedural and diagnosis code would be so she could find out from [facility].      Pt scheduled SDA Fri @ 12    Pharm:  [facility]

## 2025-01-31 ENCOUNTER — OFFICE VISIT (OUTPATIENT)
Dept: PRIMARY CARE | Facility: CLINIC | Age: 60
End: 2025-01-31
Payer: COMMERCIAL

## 2025-01-31 VITALS
HEART RATE: 102 BPM | TEMPERATURE: 99 F | OXYGEN SATURATION: 97 % | DIASTOLIC BLOOD PRESSURE: 78 MMHG | SYSTOLIC BLOOD PRESSURE: 116 MMHG

## 2025-01-31 DIAGNOSIS — I10 BENIGN ESSENTIAL HYPERTENSION: Primary | ICD-10-CM

## 2025-01-31 DIAGNOSIS — R00.0 SINUS TACHYCARDIA: ICD-10-CM

## 2025-01-31 PROCEDURE — 3078F DIAST BP <80 MM HG: CPT | Performed by: FAMILY MEDICINE

## 2025-01-31 PROCEDURE — 3074F SYST BP LT 130 MM HG: CPT | Performed by: FAMILY MEDICINE

## 2025-01-31 PROCEDURE — 99214 OFFICE O/P EST MOD 30 MIN: CPT | Performed by: FAMILY MEDICINE

## 2025-01-31 RX ORDER — LOSARTAN POTASSIUM 50 MG/1
50 TABLET ORAL DAILY
COMMUNITY
End: 2025-01-31 | Stop reason: SDUPTHER

## 2025-01-31 RX ORDER — LOSARTAN POTASSIUM 50 MG/1
50 TABLET ORAL DAILY
Qty: 90 TABLET | Refills: 1 | Status: SHIPPED | OUTPATIENT
Start: 2025-01-31

## 2025-01-31 ASSESSMENT — ENCOUNTER SYMPTOMS
OCCASIONAL FEELINGS OF UNSTEADINESS: 0
LOSS OF SENSATION IN FEET: 0
DEPRESSION: 0

## 2025-01-31 ASSESSMENT — PAIN SCALES - GENERAL: PAINLEVEL_OUTOF10: 0-NO PAIN

## 2025-01-31 NOTE — PROGRESS NOTES
Subjective   Patient ID: Aure Cabral is a 59 y.o. female who presents for Blood Pressure.    Patient is here for follow up.     Hypertension  -Patient is here for follow-up of elevated blood pressure.  BP has been running high.  HR has running high.   -Blood pressure was elevated at home - pt restarted losartan - BP has improved.   -Cardiac symptoms: none.   -Patient denies chest pain and dyspnea.   -Cardiologist:    HR has been elevated - worsening.  Patient has been unable to get down.  Readings in low teens.  No irregularity.  Regularly fast.  No dizziness.  Pt has been exercising.  Patient has been active.          Review of Systems    Objective   /78 (BP Location: Right arm, Patient Position: Sitting)   Pulse 102   Temp 37.2 °C (99 °F) (Temporal)   SpO2 97%     Physical Exam  Vitals reviewed.   Constitutional:       General: She is not in acute distress.  Cardiovascular:      Rate and Rhythm: Normal rate and regular rhythm.   Pulmonary:      Effort: Pulmonary effort is normal.      Breath sounds: No wheezing or rhonchi.   Musculoskeletal:      Right lower leg: No edema.      Left lower leg: No edema.   Lymphadenopathy:      Cervical: No cervical adenopathy.   Neurological:      Mental Status: She is alert.      EKG - NSR    Assessment/Plan   Diagnoses and all orders for this visit:  Benign essential hypertension  Sinus tachycardia    Patient Instructions   Pt is here for BP/HR check.   Sinus tachycardia on EKG.  BP is improved.  Continue losartan 50mg.  Continue checking BP and HR.      For sleep - discussed sleep meditation and box breathing.  Consider melatonin and magnesium together.     If HR is not improving we can try metoprolol to slow heart rate.        Note - Spent 42 minutes in discussion, answering list of questions, concerns, discussing sleep issues and causes of tachycardia

## 2025-01-31 NOTE — PATIENT INSTRUCTIONS
Pt is here for BP/HR check.   Sinus tachycardia on EKG.  BP is improved.  Continue losartan 50mg.  Continue checking BP and HR.      For sleep - discussed sleep meditation and box breathing.  Consider melatonin and magnesium together.     If HR is not improving we can try metoprolol to slow heart rate.

## 2025-02-06 ENCOUNTER — EVALUATION (OUTPATIENT)
Dept: OCCUPATIONAL THERAPY | Facility: CLINIC | Age: 60
End: 2025-02-06
Payer: COMMERCIAL

## 2025-02-06 DIAGNOSIS — M79.645 THUMB PAIN, LEFT: Primary | ICD-10-CM

## 2025-02-06 DIAGNOSIS — M19.049 CMC ARTHRITIS: ICD-10-CM

## 2025-02-06 PROCEDURE — 97110 THERAPEUTIC EXERCISES: CPT | Mod: GO

## 2025-02-06 PROCEDURE — 97530 THERAPEUTIC ACTIVITIES: CPT | Mod: GO

## 2025-02-06 PROCEDURE — 97165 OT EVAL LOW COMPLEX 30 MIN: CPT | Mod: GO

## 2025-02-06 NOTE — PROGRESS NOTES
Patient Name: Aure Cabral  MRN: [id number]  Today's Date: 2/7/2025  Time Calculation  Start Time: 1520  Stop Time: 1611  Time Calculation (min): 51 min      Assessment: Pt demonstrates limited thumb retropulsion and pain to dorsal wrist/thumb. This is resulting in limited participation in pain-free ADLs/IADL's and inability to perform at the prior level of function. Patient will benefit from skilled Occupational Therapy services to address impairments noted in eval.        Plan: Planned Interventions include: therapeutic exercise, therapeutic activity, self-care home management, manual therapy, therapeutic activities, neuromuscular coordination, electric stimulation, ultrasound, kinesiotaping,and IASTM.    Outpatient Plan  Frequency: 1x  Duration: 8 weeks  Rehab Potential: Fair  Plan of Care Agreement: Patient  Insurance:  Visit number: 1 of 1  Authorization info: NO AUTH, 30.00 COPAY, 100% COVERAGE, 20V PT-MAX, 2000 OOP, MMO   Insurance Type: Medical [place]    Subjective:   Chief Complaint: post left thumb CMC arthroplasty October 2022. Pt is still having intermittent pains to base of thumb.   Onset: 1-20-25  JACINTA: Chronic   DOI/DOS:   Current Problem  1. Thumb pain, left  Follow Up In Occupational Therapy      2. CMC arthritis  Referral to Occupational Therapy          General:Pt reports past surgery several years ago to thumb MP joint. Pt's MP joint appears unstable.   Hand dominance: Left handed  General  General Comment: Visit 1,2025: NO AUTH, 30.00 COPAY, 100% COVERAGE, 20V PT-MAX, 2000 OOP, MMO     Precautions: 7 # weight limit due to restriction from broken screw in neck.        Medical History Form: Reviewed (scanned into chart)    Prior Functional Level Independent with ADL'S/IADL's        Current Functional level  Pt is currently having pain with using hand pushing sheets down, lifting cat without pain in wrist. Pt reports difficulty opening jars. Pt reports pain with using mouse all day at work.         Home Set-Up   Apartment second floor.     PAIN  Pt reports increased pain with lifting using left hand. No number was given. 4/10 was given on last visit.      Observation    Small Ganglion Cyst to dorsal wrist.     Wrist AROM  Wrist AROM WFL: yes     Wrist (MMT)  L wrist flexion: (5/5): 5/5  L wrist extension: (5/5): 5/5 with volar pain  L wrist radial deviation: (5/5): 5/5  L ulnar deviation: (5/5): 5/5   Strength  R  strength: 55 (Key 16)  L  strength: 39 (6.6 Key)    Hand Assessment    Observation: thenar wasting noted. Pt is unable to lift L thumb off of table noting difficulty with EPL.      Left Hand AROM:  L Thumb MCP 0-60 (degrees): 46 Degrees  L Thumb IP 0-80 (degrees): 59 Degrees  L Thumb Radial ADduction/ABduction 0-55 (degrees): 45  L Thumb Palmar ADduction/ABduction 0-45 (degrees): 45     Therapeutic Exercise:   min  20 MINUTES  Therapeutic Exercise  Therapeutic Exercise Activity 1: Thumb isometrics 10 x 4 positions  Therapeutic Exercise Activity 2: First dorsal index isometrics x 10  Therapeutic Exercise Activity 3: Attempt thumb lift off when placed on note pad x 6.    Manual Therapy:         MINUTES  Manual Therapy  Manual Therapy Performed: Yes  Manual Therapy Activity 1: Trigger release to thumb webspace and volar thenar muscle.    Therapeutic Activity:      8 MINUTES  Therapeutic Activity  Therapeutic Activity Performed: Yes  Therapeutic Activity 1: Pt fit for comfort cool thumb spica and educated where to purchase and size ( medium +)    Education  Education  Individual(s) Educated: Patient  Home Program: AROM, Handout issued, Strengthening, Tendon gliding  Risk and Benefits Discussed with Patient/Caregiver/Other: yes  Patient/Caregiver Demonstrated Understanding: yes  Plan of Care Discussed and Agreed Upon: yes  Patient Response to Education: Patient/Caregiver Asked Appropriate Questions, Patient/Caregiver Performed Return Demonstration of Exercises/Activities,  Patient/Caregiver Verbalized Understanding of Information       Basil Ryder, OT          Active       OT Goals       Patient will improve hand strength in order to perform self-care, household, work and or leisure tasks Left Hand Strength Goal;      45     #, Key Pinch      10   #,        Start:  02/07/25    Expected End:  07/07/25            I want to have less pain with using a mouse or using my hand functionally in dorsal wrist.        Start:  02/07/25    Expected End:  07/07/25            I want to be able to open a jar without pain.        Start:  02/07/25    Expected End:  07/07/25            Pt will demonstrate in improve in function by improving to 25.0 on the quick dash to signify increased independence with ADL'S with decreased pain.         Start:  02/07/25    Expected End:  07/07/25            Patient will improve strengthening in order to perform self-care, household, work and or leisure tasks,Left Wrist Strength Goal   Extension  5 /5.       Start:  02/07/25    Expected End:  07/07/25               OT Problem       PATIENT WILL DEMONSTRATE INDEPENDENCE IN HOME PROGRAM FOR SUPPORT OF PROGRESSION       Start:  02/07/25    Expected End:  07/07/25            PATIENT WILL REPORT PAIN OF 2/10 DEMONSTRATING A REDUCTION OF OVERALL PAIN       Start:  02/07/25    Expected End:  07/07/25            Pt will demonstrate independence with conservative thumb strengthening for decreased pain and increased ability to complete ADL's/IADL's by the time of discharge.        Start:  02/07/25    Expected End:  08/07/25

## 2025-02-08 DIAGNOSIS — F41.0 PANIC DISORDER WITHOUT AGORAPHOBIA: ICD-10-CM

## 2025-02-09 RX ORDER — LORAZEPAM 0.5 MG/1
TABLET ORAL
Qty: 10 TABLET | Refills: 2 | Status: SHIPPED | OUTPATIENT
Start: 2025-02-09

## 2025-02-13 ENCOUNTER — PHARMACY VISIT (OUTPATIENT)
Dept: PHARMACY | Facility: CLINIC | Age: 60
End: 2025-02-13
Payer: COMMERCIAL

## 2025-02-13 DIAGNOSIS — F51.02 INSOMNIA DUE TO PSYCHOLOGICAL STRESS: ICD-10-CM

## 2025-02-13 PROCEDURE — RXOTC WILLOW AMBULATORY OTC CHARGE

## 2025-02-13 PROCEDURE — RXMED WILLOW AMBULATORY MEDICATION CHARGE

## 2025-02-13 RX ORDER — ZOLPIDEM TARTRATE 5 MG/1
5 TABLET ORAL NIGHTLY PRN
Qty: 30 TABLET | Refills: 2 | Status: SHIPPED | OUTPATIENT
Start: 2025-02-13 | End: 2025-03-15

## 2025-02-20 ENCOUNTER — TREATMENT (OUTPATIENT)
Dept: OCCUPATIONAL THERAPY | Facility: CLINIC | Age: 60
End: 2025-02-20
Payer: COMMERCIAL

## 2025-02-20 DIAGNOSIS — M79.645 THUMB PAIN, LEFT: ICD-10-CM

## 2025-02-20 PROCEDURE — 97110 THERAPEUTIC EXERCISES: CPT | Mod: GO

## 2025-02-20 PROCEDURE — 97035 APP MDLTY 1+ULTRASOUND EA 15: CPT | Mod: GO

## 2025-02-20 PROCEDURE — 97530 THERAPEUTIC ACTIVITIES: CPT | Mod: GO

## 2025-02-20 NOTE — PROGRESS NOTES
Occupational Therapy  Occupational Therapy Treatment    Patient Name: Aure Cabral  MRN: [id number]  Today's Date: 2/20/2025  Time Calculation  Start Time: 1534  Stop Time: 1616  Time Calculation (min): 42 min    Visit # 2       General Comment: Visit 2,2025: NO AUTH, 30.00 COPAY, 100% COVERAGE, 20V PT-MAX, 2000 OOP, MMO    Assessment: Tissue mobility increased , muscle fatigue increased.  Pt completed session with some difficulty.           Plan: Progress with POC, As tolerated and monitor home program.        Current Problem  1. Thumb pain, left  Follow Up In Occupational Therapy          Precautions N/A       Subjective:   Patient reports I still cannot lift my thumb off. It's not hurting today.     Pain   0/10    Performing HEP?: Yes    Objective:   All exercises held for 10 seconds unless noted otherwise   Treatments:   This therapist instructed and demonstrated interventions to patient, patient completed the following under direct supervision of this therapist:  Modalities:         MINUTES  3.3 MHz .8 W c/m2 for 8 minutes to in preparation for ROM/strength exercises   To dorsal thumb.     Therapeutic Exercise:   min    MINUTES  Therapeutic Exercise  Therapeutic Exercise Activity 1: Thumb isometrics 10 x 2 positions  Therapeutic Exercise Activity 2: First dorsal index isometrics x 10 2 sets  Therapeutic Exercise Activity 3: C isometric    Manual Therapy:         MINUTES  Manual Therapy  Manual Therapy Activity 1: Trigger release to thumb webspace  Manual Therapy Activity 2: Thumb distraction x 10  Billed under activity.   Therapeutic Activity:        MINUTES  Therapeutic Activity  Therapeutic Activity 1: Rock tape applied to promote more thumb extension and put the R thumb joint into a better resting position.Then a orfit orfacast short thumb spica was fabricated for resting the thumb and optimal positioning  Discussed with patient that she potentially has a ruptured tendon and the purpose of the tendon.  EPL  of the thumb.           Basil Ryder, OT, CHT    Active       OT Goals       Patient will improve hand strength in order to perform self-care, household, work and or leisure tasks Left Hand Strength Goal;      45     #, Key Pinch      10   #,        Start:  02/07/25    Expected End:  07/07/25            I want to have less pain with using a mouse or using my hand functionally in dorsal wrist.        Start:  02/07/25    Expected End:  07/07/25            I want to be able to open a jar without pain.        Start:  02/07/25    Expected End:  07/07/25            Pt will demonstrate in improve in function by improving to 25.0 on the quick dash to signify increased independence with ADL'S with decreased pain.         Start:  02/07/25    Expected End:  07/07/25            Patient will improve strengthening in order to perform self-care, household, work and or leisure tasks,Left Wrist Strength Goal   Extension  5 /5.       Start:  02/07/25    Expected End:  07/07/25               OT Problem       PATIENT WILL DEMONSTRATE INDEPENDENCE IN HOME PROGRAM FOR SUPPORT OF PROGRESSION       Start:  02/07/25    Expected End:  07/07/25            PATIENT WILL REPORT PAIN OF 2/10 DEMONSTRATING A REDUCTION OF OVERALL PAIN       Start:  02/07/25    Expected End:  07/07/25            Pt will demonstrate independence with conservative thumb strengthening for decreased pain and increased ability to complete ADL's/IADL's by the time of discharge.        Start:  02/07/25    Expected End:  08/07/25

## 2025-02-27 ENCOUNTER — TREATMENT (OUTPATIENT)
Dept: OCCUPATIONAL THERAPY | Facility: CLINIC | Age: 60
End: 2025-02-27
Payer: COMMERCIAL

## 2025-02-27 DIAGNOSIS — M79.645 THUMB PAIN, LEFT: Primary | ICD-10-CM

## 2025-02-27 PROCEDURE — 97110 THERAPEUTIC EXERCISES: CPT | Mod: GO

## 2025-02-27 PROCEDURE — 97140 MANUAL THERAPY 1/> REGIONS: CPT | Mod: GO

## 2025-02-27 PROCEDURE — 97035 APP MDLTY 1+ULTRASOUND EA 15: CPT | Mod: GO

## 2025-02-27 NOTE — PROGRESS NOTES
Occupational Therapy  Occupational Therapy Treatment    Patient Name: Aure Cabral  MRN: [id number]  Today's Date: 2/27/2025  Time Calculation  Start Time: 1734  Stop Time: 1817  Time Calculation (min): 43 min    Visit # 3            Assessment: Tissue mobility increased , Joint mobility/ROM increased, flexibility increased, muscle fatigue increased.    Pt completed session with some difficulty.           Plan: Progress with POC, As tolerated and monitor home program.        Current Problem  1. Thumb pain, left            Precautions N/A       Subjective:   Patient reports It feels like it is getting better. My cyst doesn't hurt every day.     Pain   No number.     Performing HEP?: Yes    Objective:   All exercises held for 10 seconds unless noted otherwise   Treatments:   This therapist instructed and demonstrated interventions to patient, patient completed the following under direct supervision of this therapist:  Modalities:        8MINUTES  3.3 MHz .8 W c/m2 for 8 minutes to in preparation for ROM/strength exercises   to dorsal thumb.      Therapeutic Exercise:   min  20 MINUTES  Therapeutic Exercise  Therapeutic Exercise Activity 3: C isometric x 10  Therapeutic Exercise Activity 4: 1st dorsal strength with yellow band.  Therapeutic Exercise Activity 6: C with yellow band to index, LF and RF. x 10 each  Therapeutic Exercise Activity 8: Wrist extension PRE x 10 2#  Therapeutic Exercise Activity 9: wrist ext PRE x 10 grn band    Manual Therapy:       15 MINUTES  Manual Therapy  Manual Therapy Activity 1: Trigger release to thumb webspace and dorsal forearm  Manual Therapy Activity 2: Thumb distraction x 10        Education: Pt educated on resistive 1st dorsal strength and C strength. Pt educated on wrist ext PRE with weight and with band.       Basil Ryder, OT, CHT    Active       OT Goals       Patient will improve hand strength in order to perform self-care, household, work and or leisure tasks Left Hand  Strength Goal;      45     #, Key Pinch      10   #,        Start:  02/07/25    Expected End:  07/07/25            I want to have less pain with using a mouse or using my hand functionally in dorsal wrist.        Start:  02/07/25    Expected End:  07/07/25            I want to be able to open a jar without pain.        Start:  02/07/25    Expected End:  07/07/25            Pt will demonstrate in improve in function by improving to 25.0 on the quick dash to signify increased independence with ADL'S with decreased pain.         Start:  02/07/25    Expected End:  07/07/25            Patient will improve strengthening in order to perform self-care, household, work and or leisure tasks,Left Wrist Strength Goal   Extension  5 /5.       Start:  02/07/25    Expected End:  07/07/25               OT Problem       PATIENT WILL DEMONSTRATE INDEPENDENCE IN HOME PROGRAM FOR SUPPORT OF PROGRESSION       Start:  02/07/25    Expected End:  07/07/25            PATIENT WILL REPORT PAIN OF 2/10 DEMONSTRATING A REDUCTION OF OVERALL PAIN       Start:  02/07/25    Expected End:  07/07/25            Pt will demonstrate independence with conservative thumb strengthening for decreased pain and increased ability to complete ADL's/IADL's by the time of discharge.        Start:  02/07/25    Expected End:  08/07/25

## 2025-03-06 ENCOUNTER — APPOINTMENT (OUTPATIENT)
Dept: OCCUPATIONAL THERAPY | Facility: CLINIC | Age: 60
End: 2025-03-06
Payer: COMMERCIAL

## 2025-03-13 ENCOUNTER — DOCUMENTATION (OUTPATIENT)
Dept: OCCUPATIONAL THERAPY | Facility: CLINIC | Age: 60
End: 2025-03-13
Payer: COMMERCIAL

## 2025-03-13 PROCEDURE — RXMED WILLOW AMBULATORY MEDICATION CHARGE

## 2025-03-13 NOTE — PROGRESS NOTES
Occupational Therapy                 Therapy Communication Note    Patient Name: Aure Cabral  MRN: [id number]  Department:   Rehab  Today's Date: 3/13/2025     Discipline: Occupational Therapy              Missed Time: No Show

## 2025-03-14 ENCOUNTER — PHARMACY VISIT (OUTPATIENT)
Dept: PHARMACY | Facility: CLINIC | Age: 60
End: 2025-03-14
Payer: COMMERCIAL

## 2025-03-20 ENCOUNTER — APPOINTMENT (OUTPATIENT)
Dept: OCCUPATIONAL THERAPY | Facility: CLINIC | Age: 60
End: 2025-03-20
Payer: COMMERCIAL

## 2025-03-27 ENCOUNTER — TREATMENT (OUTPATIENT)
Dept: OCCUPATIONAL THERAPY | Facility: CLINIC | Age: 60
End: 2025-03-27
Payer: COMMERCIAL

## 2025-03-27 NOTE — PROGRESS NOTES
Occupational Therapy  Occupational Therapy Treatment    Patient Name: Aure Cabral  MRN: [id number]  Today's Date: 3/27/2025       Visit # Visit count could not be calculated. Make sure you are using a visit which is associated with an episode.            Assessment: Tissue mobility increased ***, Joint mobility/ROM increased***, flexibility increased***, muscle fatigue increased ***, Pain ***, muscle guarding ***.    Pt completed session with some difficulty.           Plan: Progress with POC, As tolerated and monitor home program.        Current Problem  No diagnosis found.    Precautions       Subjective:   Patient reports ***    Pain   *    Performing HEP?: {Yes/No:83750}    Objective:   All exercises held for 10 seconds unless noted otherwise   Treatments:   This therapist instructed and demonstrated interventions to patient, patient completed the following under direct supervision of this therapist:  Modalities:         MINUTES      Therapeutic Exercise:   min    MINUTES       Manual Therapy:         MINUTES       Therapeutic Activity:        MINUTES         Education:      Basil Ryder, OT, CHT    Active       OT Goals       Patient will improve hand strength in order to perform self-care, household, work and or leisure tasks Left Hand Strength Goal;      45     #, Key Pinch      10   #,        Start:  02/07/25    Expected End:  07/07/25            I want to have less pain with using a mouse or using my hand functionally in dorsal wrist.        Start:  02/07/25    Expected End:  07/07/25            I want to be able to open a jar without pain.        Start:  02/07/25    Expected End:  07/07/25            Pt will demonstrate in improve in function by improving to 25.0 on the quick dash to signify increased independence with ADL'S with decreased pain.         Start:  02/07/25    Expected End:  07/07/25            Patient will improve strengthening in order to perform self-care, household, work and or  leisure tasks,Left Wrist Strength Goal   Extension  5 /5.       Start:  02/07/25    Expected End:  07/07/25               OT Problem       PATIENT WILL DEMONSTRATE INDEPENDENCE IN HOME PROGRAM FOR SUPPORT OF PROGRESSION       Start:  02/07/25    Expected End:  07/07/25            PATIENT WILL REPORT PAIN OF 2/10 DEMONSTRATING A REDUCTION OF OVERALL PAIN       Start:  02/07/25    Expected End:  07/07/25            Pt will demonstrate independence with conservative thumb strengthening for decreased pain and increased ability to complete ADL's/IADL's by the time of discharge.        Start:  02/07/25    Expected End:  08/07/25

## 2025-04-03 ENCOUNTER — APPOINTMENT (OUTPATIENT)
Dept: OCCUPATIONAL THERAPY | Facility: CLINIC | Age: 60
End: 2025-04-03
Payer: COMMERCIAL

## 2025-04-11 ENCOUNTER — PHARMACY VISIT (OUTPATIENT)
Dept: PHARMACY | Facility: CLINIC | Age: 60
End: 2025-04-11
Payer: COMMERCIAL

## 2025-04-11 DIAGNOSIS — F51.02 INSOMNIA DUE TO PSYCHOLOGICAL STRESS: ICD-10-CM

## 2025-04-11 PROCEDURE — RXMED WILLOW AMBULATORY MEDICATION CHARGE

## 2025-04-11 RX ORDER — ZOLPIDEM TARTRATE 5 MG/1
5 TABLET ORAL NIGHTLY PRN
Qty: 30 TABLET | Refills: 2 | Status: SHIPPED | OUTPATIENT
Start: 2025-04-11 | End: 2025-05-11

## 2025-04-15 ENCOUNTER — OFFICE VISIT (OUTPATIENT)
Dept: PRIMARY CARE | Facility: CLINIC | Age: 60
End: 2025-04-15
Payer: COMMERCIAL

## 2025-04-15 VITALS
HEIGHT: 65 IN | TEMPERATURE: 97.8 F | WEIGHT: 143.4 LBS | RESPIRATION RATE: 18 BRPM | HEART RATE: 103 BPM | DIASTOLIC BLOOD PRESSURE: 78 MMHG | BODY MASS INDEX: 23.89 KG/M2 | SYSTOLIC BLOOD PRESSURE: 106 MMHG | OXYGEN SATURATION: 94 %

## 2025-04-15 DIAGNOSIS — Z12.31 VISIT FOR SCREENING MAMMOGRAM: ICD-10-CM

## 2025-04-15 DIAGNOSIS — E87.6 HYPOKALEMIA: ICD-10-CM

## 2025-04-15 DIAGNOSIS — E03.9 HYPOTHYROIDISM, UNSPECIFIED TYPE: ICD-10-CM

## 2025-04-15 DIAGNOSIS — M47.812 CERVICAL SPONDYLOSIS WITHOUT MYELOPATHY: ICD-10-CM

## 2025-04-15 DIAGNOSIS — F51.02 INSOMNIA DUE TO PSYCHOLOGICAL STRESS: ICD-10-CM

## 2025-04-15 DIAGNOSIS — L73.9 FOLLICULITIS: ICD-10-CM

## 2025-04-15 DIAGNOSIS — F41.0 PANIC DISORDER WITHOUT AGORAPHOBIA: ICD-10-CM

## 2025-04-15 DIAGNOSIS — I10 BENIGN ESSENTIAL HYPERTENSION: Primary | ICD-10-CM

## 2025-04-15 PROCEDURE — 3074F SYST BP LT 130 MM HG: CPT | Performed by: FAMILY MEDICINE

## 2025-04-15 PROCEDURE — 3008F BODY MASS INDEX DOCD: CPT | Performed by: FAMILY MEDICINE

## 2025-04-15 PROCEDURE — 99214 OFFICE O/P EST MOD 30 MIN: CPT | Performed by: FAMILY MEDICINE

## 2025-04-15 PROCEDURE — 1036F TOBACCO NON-USER: CPT | Performed by: FAMILY MEDICINE

## 2025-04-15 PROCEDURE — 3078F DIAST BP <80 MM HG: CPT | Performed by: FAMILY MEDICINE

## 2025-04-15 RX ORDER — ZOLPIDEM TARTRATE 5 MG/1
5 TABLET ORAL NIGHTLY PRN
Qty: 30 TABLET | Refills: 2 | Status: SHIPPED | OUTPATIENT
Start: 2025-04-15 | End: 2025-05-15

## 2025-04-15 RX ORDER — CYCLOBENZAPRINE HCL 10 MG
10 TABLET ORAL 3 TIMES DAILY PRN
Qty: 270 TABLET | Refills: 1 | Status: SHIPPED | OUTPATIENT
Start: 2025-04-15

## 2025-04-15 RX ORDER — CEPHALEXIN 500 MG/1
500 CAPSULE ORAL 2 TIMES DAILY
Qty: 20 CAPSULE | Refills: 0 | Status: SHIPPED | OUTPATIENT
Start: 2025-04-15 | End: 2025-04-25

## 2025-04-15 RX ORDER — POTASSIUM CHLORIDE 20 MEQ/1
20 TABLET, EXTENDED RELEASE ORAL DAILY
Qty: 90 TABLET | Refills: 1 | Status: SHIPPED | OUTPATIENT
Start: 2025-04-15

## 2025-04-15 RX ORDER — LORAZEPAM 0.5 MG/1
.25-.5 TABLET ORAL DAILY PRN
Qty: 10 TABLET | Refills: 2 | Status: SHIPPED | OUTPATIENT
Start: 2025-04-15

## 2025-04-15 ASSESSMENT — LIFESTYLE VARIABLES
HOW OFTEN DO YOU HAVE A DRINK CONTAINING ALCOHOL: NEVER
HOW MANY STANDARD DRINKS CONTAINING ALCOHOL DO YOU HAVE ON A TYPICAL DAY: PATIENT DOES NOT DRINK
AUDIT-C TOTAL SCORE: 0
HOW OFTEN DO YOU HAVE SIX OR MORE DRINKS ON ONE OCCASION: NEVER
SKIP TO QUESTIONS 9-10: 1

## 2025-04-15 ASSESSMENT — ENCOUNTER SYMPTOMS
OCCASIONAL FEELINGS OF UNSTEADINESS: 0
DEPRESSION: 0
LOSS OF SENSATION IN FEET: 0
HYPERTENSION: 1

## 2025-04-15 ASSESSMENT — PAIN SCALES - GENERAL: PAINLEVEL_OUTOF10: 0-NO PAIN

## 2025-04-15 NOTE — PATIENT INSTRUCTIONS
Here for follow up.  Order for mammogram.     For immunizations - you can get the following at the pharmacy:  Shingrix (Shingles) and Prevnar 20 (pneumonia).      For rash - likely folliculitis - recommend trial of keflex to see if clears up.  If not improvement then dermatology    Blood pressure is well controlled.  Continue losartan.     For sleep - continue ambien    For anxiety - use lorazepam as needed.      Follow up in 6 months.

## 2025-04-15 NOTE — PROGRESS NOTES
"Subjective   Patient ID: Aure Cabral is a 59 y.o. female who presents for Hypertension.    Here for follow up.     Hypertension  -Patient is here for follow-up of elevated blood pressure.  Using losartan - still has elevated HR.    -Blood pressure has been well controlled.   -Cardiac symptoms: none.   -Patient denies chest pain and dyspnea.   -Cardiologist:    Rash  -On left arm - 3 spots.  Multiple spots on back.  No spots on legs.  Lesion filled with clear fluid.    -Treatment: benzyl peroxide - caused more pruritus.  Tried neosporin - no benefit, hydrocortisone - no benefit    Sleep disturbance  -Using ambien and melatonin.  Still has sleep issues.  Patient has anxiety from work, trying to sleep.      Anxiety  -Stress at work.  Lorazepam sparingly.      Hypertension         Review of Systems    Objective   /78   Pulse 103   Temp 36.6 °C (97.8 °F) (Temporal)   Resp 18   Ht 1.651 m (5' 5\")   Wt 65 kg (143 lb 6.4 oz)   SpO2 94%   BMI 23.86 kg/m²     Physical Exam  Vitals reviewed.   Constitutional:       General: She is not in acute distress.  Cardiovascular:      Rate and Rhythm: Normal rate and regular rhythm.   Pulmonary:      Effort: Pulmonary effort is normal.      Breath sounds: No wheezing or rhonchi.   Musculoskeletal:      Right lower leg: No edema.      Left lower leg: No edema.   Lymphadenopathy:      Cervical: No cervical adenopathy.   Skin:     Findings: Rash present. Rash is pustular.          Neurological:      Mental Status: She is alert.     An OARRS report was pulled up from database and I have personally reviewed the results.  I have considered the risk of abuse, dependance, addiction, and diversion.  I believe it is clinically appropriate for this patient to continue taking this medication.      Assessment/Plan   Diagnoses and all orders for this visit:  Benign essential hypertension  Hypothyroidism, unspecified type    Patient Instructions   Here for follow up.  Order for " mammogram.     For immunizations - you can get the following at the pharmacy:  Shingrix (Shingles) and Prevnar 20 (pneumonia).      For rash - likely folliculitis - recommend trial of keflex to see if clears up.  If not improvement then dermatology    Blood pressure is well controlled.  Continue losartan.     For sleep - continue ambien    For anxiety - use lorazepam as needed.      Follow up in 6 months.

## 2025-04-18 ENCOUNTER — APPOINTMENT (OUTPATIENT)
Dept: PRIMARY CARE | Facility: CLINIC | Age: 60
End: 2025-04-18
Payer: COMMERCIAL

## 2025-05-12 ENCOUNTER — TELEPHONE (OUTPATIENT)
Facility: CLINIC | Age: 60
End: 2025-05-12
Payer: COMMERCIAL

## 2025-05-12 RX ORDER — LIDOCAINE HYDROCHLORIDE 20 MG/ML
5 JELLY TOPICAL
Qty: 60 ML | Refills: 0 | OUTPATIENT
Start: 2025-05-12

## 2025-05-12 NOTE — TELEPHONE ENCOUNTER
Pt req refill of lidocaine gel for vaginal area, she had it rx in the ER and they told her to call here for a refill

## 2025-05-27 ENCOUNTER — DOCUMENTATION (OUTPATIENT)
Dept: OCCUPATIONAL THERAPY | Facility: CLINIC | Age: 60
End: 2025-05-27
Payer: COMMERCIAL

## 2025-05-27 NOTE — PROGRESS NOTES
Occupational Therapy    Discharge Summary    Name: Aure Cabral  MRN: [id number]  : 1965  Date: 25    Discharge Summary: OT    Discharge Information: Date of discharge 25, Date of last visit 25, Date of evaluation 25, Number of attended visits 3, Referred by Dr. Connor, and Referred for L thumb    Therapy Summary: Pt's pain, ROM, strength and soft tissue were addressed.     Discharge Status: Pt reported having intermittently less pain at her cyst.     Rehab Discharge Reason: Failed to schedule and/or keep follow-up appointment(s)

## 2025-05-29 ENCOUNTER — TELEPHONE (OUTPATIENT)
Dept: PRIMARY CARE | Facility: CLINIC | Age: 60
End: 2025-05-29
Payer: COMMERCIAL

## 2025-05-29 DIAGNOSIS — F51.02 INSOMNIA DUE TO PSYCHOLOGICAL STRESS: Primary | ICD-10-CM

## 2025-05-29 RX ORDER — ZOLPIDEM TARTRATE 6.25 MG/1
6.25 TABLET, FILM COATED, EXTENDED RELEASE ORAL NIGHTLY PRN
Qty: 30 TABLET | Refills: 0 | Status: SHIPPED | OUTPATIENT
Start: 2025-05-29 | End: 2025-06-28

## 2025-05-29 RX ORDER — ZOLPIDEM TARTRATE 12.5 MG/1
12.5 TABLET, FILM COATED, EXTENDED RELEASE ORAL NIGHTLY PRN
Qty: 30 TABLET | Refills: 0 | Status: SHIPPED | OUTPATIENT
Start: 2025-05-29 | End: 2025-05-29 | Stop reason: WASHOUT

## 2025-05-29 NOTE — TELEPHONE ENCOUNTER
Pt is calling back to ask why the jump to the 12.5 mg and the 6 mg of the ambien ? Do you know if you can break in half ? Please advise and call [phone]   Consent 3/Introductory Paragraph: I gave the patient a chance to ask questions they had about the procedure.  Following this I explained the Mohs procedure and consent was obtained. The risks, benefits and alternatives to therapy were discussed in detail. Specifically, the risks of infection, scarring, bleeding, prolonged wound healing, incomplete removal, allergy to anesthesia, nerve injury and recurrence were addressed. Prior to the procedure, the treatment site was clearly identified and confirmed by the patient. All components of Universal Protocol/PAUSE Rule completed.

## 2025-05-29 NOTE — TELEPHONE ENCOUNTER
Arue called stating she is on medication:    zolpidem (Ambien) 5 mg tablet Take 1 tablet (5 mg) by mouth as needed at bedtime for sleep.     She is stating she is only getting 4 hours a sleep at night and this has become a problem when driving. She is asking if she can increase her dose. She is asking if she can try the next dose up in a CR Extended release form.     PHARMACY:   [facility] [address] [place] [address] DRIVE Phone: [phone]   Fax: [phone]        Aure's # [phone]

## 2025-06-02 ENCOUNTER — TELEPHONE (OUTPATIENT)
Dept: PRIMARY CARE | Facility: CLINIC | Age: 60
End: 2025-06-02
Payer: COMMERCIAL

## 2025-06-02 DIAGNOSIS — F51.02 INSOMNIA DUE TO PSYCHOLOGICAL STRESS: ICD-10-CM

## 2025-06-02 RX ORDER — ZOLPIDEM TARTRATE 5 MG/1
5 TABLET ORAL NIGHTLY PRN
Qty: 30 TABLET | Refills: 2 | Status: SHIPPED | OUTPATIENT
Start: 2025-06-02 | End: 2025-07-02

## 2025-06-02 NOTE — TELEPHONE ENCOUNTER
PT IS STATING THAT THE zolpidem CR (Ambien CR) 6.25 mg MADE HER ENTIRELY TO TIRED AND SHE COULDN'T FUNCTION THROUGHOUT THE DAY, SHE HASN'T TAKING ANYMORE SINE SATURDAY 05/31/25 BECAUSE ALL SHE COULD DO WAS LAY DOWN FROM HOW TIRED SHE WAS, SAYS SHE DIDN'T FEEL SAFE TO DRIVE OR LEAVE THE HOUSE.    PT WANTS TO KNOW IF DR COULD GO BACK TO HER REGULAR zolpidem (Ambien) 5 mg tablet THAT SHE USED TO TAKE    [address] [address]  Phone: [phone]  Fax: [phone]     PLEASE ADVISE    ABBY  [phone]

## 2025-06-19 ENCOUNTER — APPOINTMENT (OUTPATIENT)
Dept: PRIMARY CARE | Facility: CLINIC | Age: 60
End: 2025-06-19
Payer: COMMERCIAL

## 2025-07-06 DIAGNOSIS — F41.0 PANIC DISORDER WITHOUT AGORAPHOBIA: ICD-10-CM

## 2025-07-07 RX ORDER — LORAZEPAM 0.5 MG/1
TABLET ORAL
Qty: 10 TABLET | Refills: 2 | Status: SHIPPED | OUTPATIENT
Start: 2025-07-07

## 2025-08-01 DIAGNOSIS — I10 BENIGN ESSENTIAL HYPERTENSION: ICD-10-CM

## 2025-08-01 RX ORDER — LOSARTAN POTASSIUM 50 MG/1
50 TABLET ORAL DAILY
Qty: 90 TABLET | Refills: 1 | Status: SHIPPED | OUTPATIENT
Start: 2025-08-01

## 2025-08-01 NOTE — TELEPHONE ENCOUNTER
Med refill   losartan (Cozaar) 50 mg tablet Take 1 tablet (50 mg) by mouth once daily.  Number of times this order has been changed since signin     [facility] [address] [place] [address] (Pharmacy) [phone]

## 2025-08-18 ENCOUNTER — TELEPHONE (OUTPATIENT)
Facility: CLINIC | Age: 60
End: 2025-08-18
Payer: COMMERCIAL

## 2025-08-18 DIAGNOSIS — N95.2 VAGINAL ATROPHY: Primary | ICD-10-CM

## 2025-08-18 RX ORDER — ESTRADIOL 0.1 MG/G
1 CREAM VAGINAL 2 TIMES WEEKLY
Qty: 42.5 G | Refills: 0 | Status: SHIPPED | OUTPATIENT
Start: 2025-08-18 | End: 2026-08-18

## 2025-09-01 DIAGNOSIS — F51.02 INSOMNIA DUE TO PSYCHOLOGICAL STRESS: ICD-10-CM

## 2025-09-02 RX ORDER — ZOLPIDEM TARTRATE 5 MG/1
5 TABLET ORAL NIGHTLY PRN
Qty: 30 TABLET | Refills: 2 | Status: SHIPPED | OUTPATIENT
Start: 2025-09-02

## 2025-10-28 ENCOUNTER — APPOINTMENT (OUTPATIENT)
Dept: AUDIOLOGY | Facility: CLINIC | Age: 60
End: 2025-10-28
Payer: COMMERCIAL

## 2025-10-28 ENCOUNTER — APPOINTMENT (OUTPATIENT)
Dept: OTOLARYNGOLOGY | Facility: CLINIC | Age: 60
End: 2025-10-28
Payer: COMMERCIAL